# Patient Record
Sex: FEMALE | Race: BLACK OR AFRICAN AMERICAN | NOT HISPANIC OR LATINO
[De-identification: names, ages, dates, MRNs, and addresses within clinical notes are randomized per-mention and may not be internally consistent; named-entity substitution may affect disease eponyms.]

---

## 2022-01-18 ENCOUNTER — APPOINTMENT (OUTPATIENT)
Dept: ANTEPARTUM | Facility: CLINIC | Age: 32
End: 2022-01-18

## 2022-01-18 ENCOUNTER — OUTPATIENT (OUTPATIENT)
Dept: INPATIENT UNIT | Facility: HOSPITAL | Age: 32
LOS: 1 days | Discharge: HOME | End: 2022-01-18
Payer: MEDICAID

## 2022-01-18 ENCOUNTER — ASOB RESULT (OUTPATIENT)
Age: 32
End: 2022-01-18

## 2022-01-18 VITALS
HEART RATE: 69 BPM | DIASTOLIC BLOOD PRESSURE: 60 MMHG | TEMPERATURE: 98 F | RESPIRATION RATE: 15 BRPM | SYSTOLIC BLOOD PRESSURE: 114 MMHG

## 2022-01-18 VITALS — DIASTOLIC BLOOD PRESSURE: 64 MMHG | SYSTOLIC BLOOD PRESSURE: 113 MMHG | HEART RATE: 71 BPM

## 2022-01-18 DIAGNOSIS — Z3A.00 WEEKS OF GESTATION OF PREGNANCY NOT SPECIFIED: ICD-10-CM

## 2022-01-18 DIAGNOSIS — O26.899 OTHER SPECIFIED PREGNANCY RELATED CONDITIONS, UNSPECIFIED TRIMESTER: ICD-10-CM

## 2022-01-18 DIAGNOSIS — Z98.890 OTHER SPECIFIED POSTPROCEDURAL STATES: Chronic | ICD-10-CM

## 2022-01-18 PROBLEM — Z00.00 ENCOUNTER FOR PREVENTIVE HEALTH EXAMINATION: Status: ACTIVE | Noted: 2022-01-18

## 2022-01-18 PROCEDURE — 76818 FETAL BIOPHYS PROFILE W/NST: CPT | Mod: 26

## 2022-01-18 PROCEDURE — 99213 OFFICE O/P EST LOW 20 MIN: CPT

## 2022-01-18 NOTE — OB PROVIDER TRIAGE NOTE - HISTORY OF PRESENT ILLNESS
This is a 31 year old  at 37.0 weeks gestational age presents from [facility] to transfer PN for findings of polyhydramnios. Pt states that she has been followed in [facility] for MELISSA of 30's since decemeber. States repeat glucose and genetics were wnl and polyhydramnios is unexplained. Reports +GFM, denies LOF, VB or contractions.   Reports + Covid infection- unsure when, not covid vaccinated. denies fever, chills, SOB or cough  AP course otherwise uncomplicated    med: asthma, albuterol prn, last took this am  surg: toe surgery  gyn: jose g D&C  top D&C  OB: 2011  7#6  current meds: pnv, albuterol  NKDA   seasonal, shellfish- hives/ anaphalaxysis

## 2022-01-18 NOTE — OB RN TRIAGE NOTE - FALL HARM RISK - UNIVERSAL INTERVENTIONS
Bed in lowest position, wheels locked, appropriate side rails in place/Call bell, personal items and telephone in reach/Instruct patient to call for assistance before getting out of bed or chair/Non-slip footwear when patient is out of bed/[place] to call system/Physically safe environment - no spills, clutter or unnecessary equipment/Purposeful Proactive Rounding/Room/bathroom lighting operational, light cord in reach

## 2022-01-18 NOTE — CHART NOTE - NSCHARTNOTEFT_GEN_A_CORE
R3 Note    Patient scanned at bedside w/ Dr. Rojas.  MELISSA at [place] [address], repeat MELISSA performed by us 32.5.  Will schedule patient for IOL at 38 wks on 1/23.  Process of IOL explained to patient, she is in agreement.  Patient aware to expect call with follow up details on timing of induction.    MGreenman PGY3

## 2022-01-18 NOTE — OB PROVIDER TRIAGE NOTE - NSHPPHYSICALEXAM_GEN_ALL_CORE
Vital Signs Last 24 Hrs  T(C): 36.7 (18 Jan 2022 11:20), Max: 36.7 (18 Jan 2022 11:20)  T(F): 98.1 (18 Jan 2022 11:20), Max: 98.1 (18 Jan 2022 11:20)  HR: 69 (18 Jan 2022 11:20) (69 - 69)  BP: 114/60 (18 Jan 2022 11:20) (114/60 - 114/60)  BP(mean): --  RR: 15 (18 Jan 2022 11:20) (15 - 15)  SpO2: --    A&O x3  CTAB  abdomen: gravid, soft, nontender  NST in progress Vital Signs Last 24 Hrs  T(C): 36.7 (18 Jan 2022 11:20), Max: 36.7 (18 Jan 2022 11:20)  T(F): 98.1 (18 Jan 2022 11:20), Max: 98.1 (18 Jan 2022 11:20)  HR: 69 (18 Jan 2022 11:20) (69 - 69)  BP: 114/60 (18 Jan 2022 11:20) (114/60 - 114/60)  BP(mean): --  RR: 15 (18 Jan 2022 11:20) (15 - 15)  SpO2: --    A&O x3  CTAB  abdomen: gravid, soft, nontender   baseline, moderate variability, positive accels, no decels, no contractions  TAS: by ATU vtx, anterior placenta, bpp 8/8, 3401 g( 7#8), vanessa 29.36

## 2022-01-18 NOTE — OB PROVIDER TRIAGE NOTE - ADDITIONAL INSTRUCTIONS
Dr Rojas, Dr Vega at bedside for evaluation  maternal/ fetal surveillance reassuring  Plan for IOL on 1/23- dr vega will arrange for IOL and Induction department will call patient with details  continue fetal kick counts, rest and activity as tolerated, increase PO fluid  follow up as scheduled  pt verbalized understanding of instructions given  discharged home

## 2022-01-18 NOTE — OB PROVIDER TRIAGE NOTE - NSOBPROVIDERNOTE_OBGYN_ALL_OB_FT
1230  AT sonographer  Alina at bedside for ultrasound 1230  ATU sonographer  Alina at bedside for ultrasound    1330  Dr Rojas, Dr Vega at bedside for evaluation  maternal/ fetal surveillance reassuring  Plan for IOL on 1/23- dr vega will arrange for IOL and Induction department will call patient with details  continue fetal kick counts, rest and activity as tolerated, increase PO fluid  follow up as scheduled  pt verbalized understanding of instructions given  discharged home

## 2022-01-24 ENCOUNTER — INPATIENT (INPATIENT)
Facility: HOSPITAL | Age: 32
LOS: 5 days | Discharge: HOME HEALTH | End: 2022-01-30
Attending: SPECIALIST | Admitting: SPECIALIST
Payer: MEDICAID

## 2022-01-24 VITALS — TEMPERATURE: 99 F

## 2022-01-24 DIAGNOSIS — O26.899 OTHER SPECIFIED PREGNANCY RELATED CONDITIONS, UNSPECIFIED TRIMESTER: ICD-10-CM

## 2022-01-24 DIAGNOSIS — Z3A.00 WEEKS OF GESTATION OF PREGNANCY NOT SPECIFIED: ICD-10-CM

## 2022-01-24 DIAGNOSIS — Z98.890 OTHER SPECIFIED POSTPROCEDURAL STATES: Chronic | ICD-10-CM

## 2022-01-24 PROBLEM — J45.909 UNSPECIFIED ASTHMA, UNCOMPLICATED: Chronic | Status: ACTIVE | Noted: 2022-01-18

## 2022-01-24 LAB
BASOPHILS # BLD AUTO: 0 K/UL — SIGNIFICANT CHANGE UP (ref 0–0.2)
BASOPHILS NFR BLD AUTO: 0 % — SIGNIFICANT CHANGE UP (ref 0–2)
BLD GP AB SCN SERPL QL: NEGATIVE — SIGNIFICANT CHANGE UP
EOSINOPHIL # BLD AUTO: 3 K/UL — HIGH (ref 0–0.5)
EOSINOPHIL NFR BLD AUTO: 22.6 % — HIGH (ref 0–6)
HCT VFR BLD CALC: 33.5 % — LOW (ref 34.5–45)
HGB BLD-MCNC: 11.6 G/DL — SIGNIFICANT CHANGE UP (ref 11.5–15.5)
HIV 1+2 AB+HIV1 P24 AG SERPL QL IA: SIGNIFICANT CHANGE UP
IANC: 6.28 K/UL — SIGNIFICANT CHANGE UP (ref 1.5–8.5)
LYMPHOCYTES # BLD AUTO: 1.73 K/UL — SIGNIFICANT CHANGE UP (ref 1–3.3)
LYMPHOCYTES # BLD AUTO: 13 % — SIGNIFICANT CHANGE UP (ref 13–44)
MCHC RBC-ENTMCNC: 31.6 PG — SIGNIFICANT CHANGE UP (ref 27–34)
MCHC RBC-ENTMCNC: 34.6 GM/DL — SIGNIFICANT CHANGE UP (ref 32–36)
MCV RBC AUTO: 91.3 FL — SIGNIFICANT CHANGE UP (ref 80–100)
MONOCYTES # BLD AUTO: 0.58 K/UL — SIGNIFICANT CHANGE UP (ref 0–0.9)
MONOCYTES NFR BLD AUTO: 4.4 % — SIGNIFICANT CHANGE UP (ref 2–14)
NEUTROPHILS # BLD AUTO: 7.5 K/UL — HIGH (ref 1.8–7.4)
NEUTROPHILS NFR BLD AUTO: 56.5 % — SIGNIFICANT CHANGE UP (ref 43–77)
PLATELET # BLD AUTO: 222 K/UL — SIGNIFICANT CHANGE UP (ref 150–400)
RBC # BLD: 3.67 M/UL — LOW (ref 3.8–5.2)
RBC # FLD: 13.4 % — SIGNIFICANT CHANGE UP (ref 10.3–14.5)
RH IG SCN BLD-IMP: POSITIVE — SIGNIFICANT CHANGE UP
RH IG SCN BLD-IMP: POSITIVE — SIGNIFICANT CHANGE UP
WBC # BLD: 13.27 K/UL — HIGH (ref 3.8–10.5)
WBC # FLD AUTO: 13.27 K/UL — HIGH (ref 3.8–10.5)

## 2022-01-24 RX ORDER — OXYTOCIN 10 UNIT/ML
333.33 VIAL (ML) INJECTION
Qty: 20 | Refills: 0 | Status: DISCONTINUED | OUTPATIENT
Start: 2022-01-24 | End: 2022-01-24

## 2022-01-24 RX ORDER — CITRIC ACID/SODIUM CITRATE 300-500 MG
15 SOLUTION, ORAL ORAL EVERY 6 HOURS
Refills: 0 | Status: DISCONTINUED | OUTPATIENT
Start: 2022-01-24 | End: 2022-01-25

## 2022-01-24 RX ORDER — ALBUTEROL 90 UG/1
2 AEROSOL, METERED ORAL EVERY 6 HOURS
Refills: 0 | Status: DISCONTINUED | OUTPATIENT
Start: 2022-01-24 | End: 2022-01-26

## 2022-01-24 RX ORDER — SODIUM CHLORIDE 9 MG/ML
1000 INJECTION, SOLUTION INTRAVENOUS
Refills: 0 | Status: DISCONTINUED | OUTPATIENT
Start: 2022-01-24 | End: 2022-01-25

## 2022-01-24 RX ORDER — INFLUENZA VIRUS VACCINE 15; 15; 15; 15 UG/.5ML; UG/.5ML; UG/.5ML; UG/.5ML
0.5 SUSPENSION INTRAMUSCULAR ONCE
Refills: 0 | Status: DISCONTINUED | OUTPATIENT
Start: 2022-01-24 | End: 2022-01-30

## 2022-01-24 RX ORDER — AMPICILLIN TRIHYDRATE 250 MG
1 CAPSULE ORAL EVERY 4 HOURS
Refills: 0 | Status: DISCONTINUED | OUTPATIENT
Start: 2022-01-24 | End: 2022-01-25

## 2022-01-24 RX ORDER — AMPICILLIN TRIHYDRATE 250 MG
1 CAPSULE ORAL EVERY 4 HOURS
Refills: 0 | Status: DISCONTINUED | OUTPATIENT
Start: 2022-01-24 | End: 2022-01-24

## 2022-01-24 RX ORDER — OXYTOCIN 10 UNIT/ML
333.33 VIAL (ML) INJECTION
Qty: 20 | Refills: 0 | Status: DISCONTINUED | OUTPATIENT
Start: 2022-01-24 | End: 2022-01-25

## 2022-01-24 RX ORDER — CITRIC ACID/SODIUM CITRATE 300-500 MG
15 SOLUTION, ORAL ORAL EVERY 6 HOURS
Refills: 0 | Status: DISCONTINUED | OUTPATIENT
Start: 2022-01-24 | End: 2022-01-24

## 2022-01-24 RX ORDER — SODIUM CHLORIDE 9 MG/ML
1000 INJECTION, SOLUTION INTRAVENOUS
Refills: 0 | Status: DISCONTINUED | OUTPATIENT
Start: 2022-01-24 | End: 2022-01-24

## 2022-01-24 RX ORDER — AMPICILLIN TRIHYDRATE 250 MG
2 CAPSULE ORAL ONCE
Refills: 0 | Status: DISCONTINUED | OUTPATIENT
Start: 2022-01-24 | End: 2022-01-25

## 2022-01-24 RX ADMIN — SODIUM CHLORIDE 125 MILLILITER(S): 9 INJECTION, SOLUTION INTRAVENOUS at 19:10

## 2022-01-24 NOTE — CHART NOTE - NSCHARTNOTEFT_GEN_A_CORE
OBGYN Service Attending    Called to pt's bedside by Chief Resident Dr. Angel to reassess fetal position.  On bedside sono fetus is transverse back up with head to maternal right.  Pt is a P1 with planned induction for polyhydramnios.  Fetal status at this time is reassuring.  Discussed with pt that delivery with this fetal position would be via .  Pt would like to avoid  if possible.  Discussed possible ECV, will discuss with MFM at Safety Rounds re: possible ECV in AM.    MD Melissa

## 2022-01-24 NOTE — OB PROVIDER H&P - NSHPPHYSICALEXAM_GEN_ALL_CORE
Gen: NAD  Abd: Soft, Nontender   Ext: Nontender, no erythema    SVE: Gen: NAD  Abd: Soft, Nontender   Ext: Nontender, no erythema    SVE: 0.5/50/-3

## 2022-01-24 NOTE — CHART NOTE - NSCHARTNOTEFT_GEN_A_CORE
R4 Chart Note    Patient re-scanned for presentation at bedside w/Dr. White.   Fetus transverse back up, head maternal right.     Patient is a 35 yo  at [id number] admitted for IOL for polyhydramnios wuth MELISSA 37, previous  in 2012 (7#3) and would like to avoid  if possible and desires ECV.   Will discuss on MFM safety rounds and observe on continuos fetal monitoring. Plan for possible ECV in AM and induction of labor vs  for malpresentation.     E Stanley PGY4  Pt scanned and discussed w/Dr. White

## 2022-01-24 NOTE — OB PROVIDER H&P - HISTORY OF PRESENT ILLNESS
32 y/o  at 38w presenting for scheduled induction for polyhydramnios (MELISSA in the 30s) at [facility] where she received PNC and here last week. Denies CTX, LOF, VB. Good FM.     GBS pos   EFW     ObHx:    FT  7#3  SAB x2    GynHx: Denies  PMHx: Asthma   PSHx: denies   Meds: Albuterol,  30 y/o  at 38w presenting for scheduled induction for polyhydramnios (MELISSA in the 30s) at [facility] where she received PNC and here last week. Denies CTX, LOF, VB. Good FM.     GBS pos   EFW 3401    ObHx:    FT  7#3  SAB x2    GynHx: Denies  PMHx: Asthma   PSHx: denies   Meds: Albuterol, PNV  All: seasonal  PsychHx: denies depression/anxiety    Accepts blood

## 2022-01-24 NOTE — OB RN PATIENT PROFILE - FALL HARM RISK - UNIVERSAL INTERVENTIONS
Bed in lowest position, wheels locked, appropriate side rails in place/Call bell, personal items and telephone in reach/Instruct patient to call for assistance before getting out of bed or chair/Non-slip footwear when patient is out of bed/[place] to call system/Physically safe environment - no spills, clutter or unnecessary equipment/Purposeful Proactive Rounding/Room/bathroom lighting operational, light cord in reach

## 2022-01-24 NOTE — OB PROVIDER H&P - ASSESSMENT
32 y/o  at 38w presenting for scheduled induction for polyhydramnios  - Admit to L&D  - EFM & [place]  - IVF & CLD  - PO cytotec for induction   - NO ABHISHEK Nelson, PGY-1  32 y/o  at 38w presenting for scheduled induction for polyhydramnios  - Admit to L&D  - EFM & [place]  - IVF & CLD  - GBS pos; ampicillin  - Needs repeat sono to confirm presentation    BOYD Nelson, PGY-1

## 2022-01-24 NOTE — CHART NOTE - NSCHARTNOTEFT_GEN_A_CORE
MS. Downing is a 33 yo  at [id number] admitted for IOL for polyhydramnios with MELISSA 37, now found to have transverse presentation and expresses desire for . Pt discussed with MFM fellow, Dr. Campo, plan for ECV  AM.    Maricarmen Arroyo MD  OBGYN PGY3

## 2022-01-25 ENCOUNTER — TRANSCRIPTION ENCOUNTER (OUTPATIENT)
Age: 32
End: 2022-01-25

## 2022-01-25 LAB
COVID-19 SPIKE DOMAIN AB INTERP: POSITIVE
COVID-19 SPIKE DOMAIN ANTIBODY RESULT: 5.83 U/ML — HIGH
RUBV IGG SER-ACNC: 2.8 INDEX — SIGNIFICANT CHANGE UP
RUBV IGG SER-IMP: POSITIVE — SIGNIFICANT CHANGE UP
SARS-COV-2 IGG+IGM SERPL QL IA: 5.83 U/ML — HIGH
SARS-COV-2 IGG+IGM SERPL QL IA: POSITIVE
T PALLIDUM AB TITR SER: NEGATIVE — SIGNIFICANT CHANGE UP

## 2022-01-25 PROCEDURE — 59412 ANTEPARTUM MANIPULATION: CPT | Mod: GC

## 2022-01-25 RX ORDER — AMPICILLIN TRIHYDRATE 250 MG
1 CAPSULE ORAL EVERY 4 HOURS
Refills: 0 | Status: DISCONTINUED | OUTPATIENT
Start: 2022-01-25 | End: 2022-01-25

## 2022-01-25 RX ORDER — AMPICILLIN TRIHYDRATE 250 MG
2 CAPSULE ORAL ONCE
Refills: 0 | Status: COMPLETED | OUTPATIENT
Start: 2022-01-25 | End: 2022-01-25

## 2022-01-25 RX ORDER — OXYTOCIN 10 UNIT/ML
2 VIAL (ML) INJECTION
Qty: 30 | Refills: 0 | Status: DISCONTINUED | OUTPATIENT
Start: 2022-01-25 | End: 2022-01-26

## 2022-01-25 RX ADMIN — Medication 216 GRAM(S): at 14:03

## 2022-01-25 RX ADMIN — Medication 2 MILLIUNIT(S)/MIN: at 21:59

## 2022-01-25 RX ADMIN — Medication 108 GRAM(S): at 22:20

## 2022-01-25 RX ADMIN — Medication 108 GRAM(S): at 18:09

## 2022-01-25 NOTE — CHART NOTE - NSCHARTNOTEFT_GEN_A_CORE
PGY1 Labor & Delivery Progress Note     Pt seen & examined at bedside for complaints of painful contractions    SVE: 3/-3  EFM: Cat 1    T(C): 36.9 (22 @ 19:26), Max: 37.2 (22 @ 18:30)  HR: 86 (22 @ 20:55) (69 - 94)  BP: 121/69 (22 @ 20:55) (105/58 - 132/78)  RR: 16 (22 @ 11:00) (16 - 16)  SpO2: 97% (22 @ 20:52) (95% - 99%)    Plan:  30y/o  in stable condition  - s/p ECV and AROM for unstable lie  - vertex confirmed on bedside sono  - s/p PO, for pitocin 2x2  - pt requesting epidural  - Continuous EFM, [facility]  - Con't IVF    D/W attending physician Dr. Kannan Billy, PGY-1

## 2022-01-26 ENCOUNTER — RESULT REVIEW (OUTPATIENT)
Age: 32
End: 2022-01-26

## 2022-01-26 DIAGNOSIS — O40.3XX0 POLYHYDRAMNIOS, THIRD TRIMESTER, NOT APPLICABLE OR UNSPECIFIED: ICD-10-CM

## 2022-01-26 PROCEDURE — 59514 CESAREAN DELIVERY ONLY: CPT | Mod: U9,UB,GC

## 2022-01-26 PROCEDURE — 88307 TISSUE EXAM BY PATHOLOGIST: CPT | Mod: 26

## 2022-01-26 PROCEDURE — 93010 ELECTROCARDIOGRAM REPORT: CPT

## 2022-01-26 DEVICE — ARISTA 3GR: Type: IMPLANTABLE DEVICE | Status: FUNCTIONAL

## 2022-01-26 RX ORDER — MAGNESIUM HYDROXIDE 400 MG/1
30 TABLET, CHEWABLE ORAL
Refills: 0 | Status: DISCONTINUED | OUTPATIENT
Start: 2022-01-26 | End: 2022-01-30

## 2022-01-26 RX ORDER — OXYCODONE HYDROCHLORIDE 5 MG/1
5 TABLET ORAL
Refills: 0 | Status: DISCONTINUED | OUTPATIENT
Start: 2022-01-26 | End: 2022-01-30

## 2022-01-26 RX ORDER — SODIUM CHLORIDE 9 MG/ML
300 INJECTION INTRAMUSCULAR; INTRAVENOUS; SUBCUTANEOUS ONCE
Refills: 0 | Status: COMPLETED | OUTPATIENT
Start: 2022-01-26 | End: 2022-01-26

## 2022-01-26 RX ORDER — NALOXONE HYDROCHLORIDE 4 MG/.1ML
0.1 SPRAY NASAL
Refills: 0 | Status: DISCONTINUED | OUTPATIENT
Start: 2022-01-26 | End: 2022-01-30

## 2022-01-26 RX ORDER — OXYTOCIN 10 UNIT/ML
333.33 VIAL (ML) INJECTION
Qty: 20 | Refills: 0 | Status: DISCONTINUED | OUTPATIENT
Start: 2022-01-26 | End: 2022-01-30

## 2022-01-26 RX ORDER — OXYCODONE HYDROCHLORIDE 5 MG/1
10 TABLET ORAL
Refills: 0 | Status: DISCONTINUED | OUTPATIENT
Start: 2022-01-26 | End: 2022-01-30

## 2022-01-26 RX ORDER — SODIUM CHLORIDE 9 MG/ML
1000 INJECTION INTRAMUSCULAR; INTRAVENOUS; SUBCUTANEOUS
Refills: 0 | Status: DISCONTINUED | OUTPATIENT
Start: 2022-01-26 | End: 2022-01-26

## 2022-01-26 RX ORDER — ACETAMINOPHEN 500 MG
975 TABLET ORAL
Refills: 0 | Status: DISCONTINUED | OUTPATIENT
Start: 2022-01-26 | End: 2022-01-30

## 2022-01-26 RX ORDER — LANOLIN
1 OINTMENT (GRAM) TOPICAL EVERY 6 HOURS
Refills: 0 | Status: DISCONTINUED | OUTPATIENT
Start: 2022-01-26 | End: 2022-01-30

## 2022-01-26 RX ORDER — CITRIC ACID/SODIUM CITRATE 300-500 MG
15 SOLUTION, ORAL ORAL EVERY 6 HOURS
Refills: 0 | Status: DISCONTINUED | OUTPATIENT
Start: 2022-01-26 | End: 2022-01-26

## 2022-01-26 RX ORDER — OXYCODONE HYDROCHLORIDE 5 MG/1
5 TABLET ORAL ONCE
Refills: 0 | Status: DISCONTINUED | OUTPATIENT
Start: 2022-01-26 | End: 2022-01-30

## 2022-01-26 RX ORDER — AMPICILLIN TRIHYDRATE 250 MG
1 CAPSULE ORAL EVERY 4 HOURS
Refills: 0 | Status: DISCONTINUED | OUTPATIENT
Start: 2022-01-26 | End: 2022-01-26

## 2022-01-26 RX ORDER — ONDANSETRON 8 MG/1
4 TABLET, FILM COATED ORAL EVERY 6 HOURS
Refills: 0 | Status: DISCONTINUED | OUTPATIENT
Start: 2022-01-26 | End: 2022-01-30

## 2022-01-26 RX ORDER — DIPHENHYDRAMINE HCL 50 MG
25 CAPSULE ORAL ONCE
Refills: 0 | Status: COMPLETED | OUTPATIENT
Start: 2022-01-26 | End: 2022-01-26

## 2022-01-26 RX ORDER — HEPARIN SODIUM 5000 [USP'U]/ML
10000 INJECTION INTRAVENOUS; SUBCUTANEOUS EVERY 12 HOURS
Refills: 0 | Status: DISCONTINUED | OUTPATIENT
Start: 2022-01-26 | End: 2022-01-30

## 2022-01-26 RX ORDER — SODIUM CHLORIDE 9 MG/ML
1000 INJECTION, SOLUTION INTRAVENOUS
Refills: 0 | Status: DISCONTINUED | OUTPATIENT
Start: 2022-01-26 | End: 2022-01-27

## 2022-01-26 RX ORDER — OXYTOCIN 10 UNIT/ML
333.33 VIAL (ML) INJECTION
Qty: 20 | Refills: 0 | Status: DISCONTINUED | OUTPATIENT
Start: 2022-01-26 | End: 2022-01-27

## 2022-01-26 RX ORDER — SIMETHICONE 80 MG/1
80 TABLET, CHEWABLE ORAL EVERY 4 HOURS
Refills: 0 | Status: DISCONTINUED | OUTPATIENT
Start: 2022-01-26 | End: 2022-01-30

## 2022-01-26 RX ORDER — KETOROLAC TROMETHAMINE 30 MG/ML
30 SYRINGE (ML) INJECTION EVERY 6 HOURS
Refills: 0 | Status: COMPLETED | OUTPATIENT
Start: 2022-01-26 | End: 2022-01-27

## 2022-01-26 RX ORDER — AMPICILLIN TRIHYDRATE 250 MG
2 CAPSULE ORAL EVERY 6 HOURS
Refills: 0 | Status: CANCELLED | OUTPATIENT
Start: 2022-01-27 | End: 2022-01-26

## 2022-01-26 RX ORDER — SODIUM CHLORIDE 9 MG/ML
1000 INJECTION, SOLUTION INTRAVENOUS
Refills: 0 | Status: DISCONTINUED | OUTPATIENT
Start: 2022-01-26 | End: 2022-01-26

## 2022-01-26 RX ORDER — GENTAMICIN SULFATE 40 MG/ML
400 VIAL (ML) INJECTION ONCE
Refills: 0 | Status: DISCONTINUED | OUTPATIENT
Start: 2022-01-26 | End: 2022-01-26

## 2022-01-26 RX ORDER — AMPICILLIN TRIHYDRATE 250 MG
CAPSULE ORAL
Refills: 0 | Status: DISCONTINUED | OUTPATIENT
Start: 2022-01-26 | End: 2022-01-26

## 2022-01-26 RX ORDER — TETANUS TOXOID, REDUCED DIPHTHERIA TOXOID AND ACELLULAR PERTUSSIS VACCINE, ADSORBED 5; 2.5; 8; 8; 2.5 [IU]/.5ML; [IU]/.5ML; UG/.5ML; UG/.5ML; UG/.5ML
0.5 SUSPENSION INTRAMUSCULAR ONCE
Refills: 0 | Status: DISCONTINUED | OUTPATIENT
Start: 2022-01-26 | End: 2022-01-30

## 2022-01-26 RX ORDER — MORPHINE SULFATE 50 MG/1
2 CAPSULE, EXTENDED RELEASE ORAL ONCE
Refills: 0 | Status: DISCONTINUED | OUTPATIENT
Start: 2022-01-26 | End: 2022-01-27

## 2022-01-26 RX ORDER — ERTAPENEM SODIUM 1 G/1
1000 INJECTION, POWDER, LYOPHILIZED, FOR SOLUTION INTRAMUSCULAR; INTRAVENOUS ONCE
Refills: 0 | Status: COMPLETED | OUTPATIENT
Start: 2022-01-26 | End: 2022-01-26

## 2022-01-26 RX ORDER — DIPHENHYDRAMINE HCL 50 MG
25 CAPSULE ORAL EVERY 6 HOURS
Refills: 0 | Status: DISCONTINUED | OUTPATIENT
Start: 2022-01-26 | End: 2022-01-30

## 2022-01-26 RX ORDER — IBUPROFEN 200 MG
600 TABLET ORAL EVERY 6 HOURS
Refills: 0 | Status: COMPLETED | OUTPATIENT
Start: 2022-01-26 | End: 2022-12-25

## 2022-01-26 RX ORDER — AMPICILLIN TRIHYDRATE 250 MG
2 CAPSULE ORAL ONCE
Refills: 0 | Status: DISCONTINUED | OUTPATIENT
Start: 2022-01-26 | End: 2022-01-26

## 2022-01-26 RX ORDER — ACETAMINOPHEN 500 MG
975 TABLET ORAL ONCE
Refills: 0 | Status: COMPLETED | OUTPATIENT
Start: 2022-01-26 | End: 2022-01-26

## 2022-01-26 RX ORDER — HYDROMORPHONE HYDROCHLORIDE 2 MG/ML
1 INJECTION INTRAMUSCULAR; INTRAVENOUS; SUBCUTANEOUS
Refills: 0 | Status: DISCONTINUED | OUTPATIENT
Start: 2022-01-26 | End: 2022-01-30

## 2022-01-26 RX ADMIN — SODIUM CHLORIDE 600 MILLILITER(S): 9 INJECTION INTRAMUSCULAR; INTRAVENOUS; SUBCUTANEOUS at 13:32

## 2022-01-26 RX ADMIN — Medication 108 GRAM(S): at 14:32

## 2022-01-26 RX ADMIN — Medication 25 MILLIGRAM(S): at 17:45

## 2022-01-26 RX ADMIN — ERTAPENEM SODIUM 120 MILLIGRAM(S): 1 INJECTION, POWDER, LYOPHILIZED, FOR SOLUTION INTRAMUSCULAR; INTRAVENOUS at 23:20

## 2022-01-26 RX ADMIN — Medication 975 MILLIGRAM(S): at 20:21

## 2022-01-26 RX ADMIN — Medication 108 GRAM(S): at 02:20

## 2022-01-26 RX ADMIN — SODIUM CHLORIDE 125 MILLILITER(S): 9 INJECTION INTRAMUSCULAR; INTRAVENOUS; SUBCUTANEOUS at 13:52

## 2022-01-26 RX ADMIN — Medication 108 GRAM(S): at 18:23

## 2022-01-26 RX ADMIN — SODIUM CHLORIDE 125 MILLILITER(S): 9 INJECTION, SOLUTION INTRAVENOUS at 06:56

## 2022-01-26 RX ADMIN — Medication 108 GRAM(S): at 10:35

## 2022-01-26 NOTE — OB PROVIDER LABOR PROGRESS NOTE - ASSESSMENT
A/P: 31y P1 admitted for IOL for polyhramnios, s/p successful EVC  - Labor: for PO cytotec, AROM when able  - Fetus: reassuring  - GBS: positive, on amp  - Analgesia: MAYRAN    ELIO David PGY4  w/ Dr. Rojas
IUPC replaced.  Edematous cervix, IV benadryl to be given.   Peanut ball placed.  Reassuring FHT at this time. Will reassess in 2 hrs and decide MOD at that time.    Emory Deras,PGY2  D/w Dr. Fabian & Dr. Samaniego
IUPC in place, CTX are adequate with pitocin. No cervical change. Will start amnioinfusion.     D/w ELIO David, PGY-4  BOYD Nelson, PGY-1

## 2022-01-26 NOTE — OB PROVIDER DELIVERY SUMMARY - NSSELHIDDEN_OBGYN_ALL_OB_FT
[NS_DeliveryAttending1_OBGYN_ALL_OB_FT:[id number]],[NS_DeliveryAssist1_OBGYN_ALL_OB_FT:[id number]],[NS_DeliveryRN_OBGYN_ALL_OB_FT:[id number]=]

## 2022-01-26 NOTE — OB PROVIDER DELIVERY SUMMARY - NSPROVIDERDELIVERYNOTE_OBGYN_ALL_OB_FT
pLTCS performed due to arrest of dilation at 5cm, chorio, cat II tracing remote from delivery. Grossly normal uterus, tubes and ovaries. Viable female infant delivered from vertex presentation. APGARS 3,8. Weight 3355g. Hysterotomy reapproximated in running locked fashion with a second imbricating layer. Niels applied over hysterotomy repair and bladder flap to ensure further hemostasis. Muscles reapproximated in mattress stitches.       IVF 1700   UOP 50

## 2022-01-26 NOTE — CHART NOTE - NSCHARTNOTEFT_GEN_A_CORE
PA Note (delayed due to clinical duties)    patient seen & examined for cont of management  comfortable with epidural    VS  T(C): 37.0 (01-26-22 @ 09:05)  HR: 83 (01-26-22 @ 09:48)  BP: 128/71 (01-26-22 @ 09:32)  RR: 16 (01-26-22 @ 04:43)  SpO2: 97% (01-26-22 @ 09:40)    /min-mod david/+accels/no decels  [place] q 2-5min  4/70/-3 IUPC inserted as per Dr Rojas    cont efm/toco  cont pitocin  judith saul

## 2022-01-26 NOTE — OB PROVIDER LABOR PROGRESS NOTE - NS_SUBJECTIVE/OBJECTIVE_OBGYN_ALL_OB_FT
PGY4 Labor Note    Patient seen and evaluated at bedside.     External cephalic version performed with Dr. Rojas under ultrasound guidance. Vertex presentation confirmed multiple times. Fetal heart rate reassuring.     T(C): 37.1 (01-25-22 @ 11:00), Max: 37.2 (01-24-22 @ 18:24)  HR: 76 (01-25-22 @ 11:00) (69 - 78)  BP: 109/62 (01-25-22 @ 11:00) (108/62 - 121/62)  RR: 16 (01-25-22 @ 11:00) (16 - 17)  SpO2: 100% (01-24-22 @ 18:43) (100% - 100%)
IUPC displaced upon pt movement. Replaced IUPC.
Pt seen and examined for repetitive late decelerations

## 2022-01-26 NOTE — OB NEONATOLOGY/PEDIATRICIAN DELIVERY SUMMARY - NSPEDSNEONOTESA_OBGYN_ALL_OB_FT
Called to delivery of a 38.1 wk infant to a 31 y.o. , O+/GBS + (tx with amp x8), all other PNL unremarkable, COVID + from .  Mother was a transfer from [facility], successful version, IOL for polyhydramnios. OBHX:  (), SAB x1, TOPx1. Prolonged ROM from  at 1313 with clear fluid. Peds called to C/S due to category 2 tracing and maternal fever of 38.3 degrees. Delivered via unscheduled primary c/s, Infant noted to have poor tone and respiratory effort. W/D/S/S, CPAP started and pulse ox placed, sat noted to be 60, PPV started and at 20/5 fiO2 30%, increased to 22/5 fiO2 max of 60%, with good chest rise noted and improvement in satuations to the 90's.  PPV stopped and CPAP resumed ~4 MOL, fiO2 weaned to room air and cpap removed at ~9 MOL.  Infant sent to NICU for rule out sepsis due to EOS of 1.23.    Mother wants hep B and to breast feed. Called to delivery of a 38.1 wk infant to a 31 y.o. , O+/GBS + (tx with amp x8), all other PNL unremarkable, COVID + from .  Mother was a transfer from [facility], successful version, IOL for polyhydramnios. OBHX:  (), SAB x1, TOPx1. Prolonged ROM from  at 1313 with clear fluid. Peds called to C/S due to category 2 tracing and maternal fever of 38.3 degrees and chorio. Delivered via unscheduled primary c/s, Infant noted to have poor tone and respiratory effort. W/D/S/S, CPAP started and pulse ox placed, sat noted to be 60, PPV started and at 20/5 fiO2 30%, increased to 22/5 fiO2 max of 60%, with good chest rise noted and improvement in satuations to the 90's.  PPV stopped and CPAP resumed ~4 MOL, fiO2 weaned to room air and cpap removed at ~9 MOL.  Infant sent to NICU for rule out sepsis due to EOS of 1.23.    Mother wants hep B and to breast feed.

## 2022-01-26 NOTE — CHART NOTE - NSCHARTNOTEFT_GEN_A_CORE
R2 Labor Progress Note    Pt noted to have category 2 tracing (minimal variability, few accels). Pt has been on pitocin for IOL for 14 hours without significant cervical change. Discussed the increased risk of postpartum hemorrhage after prolonged induction of labor with pitocin with the patient. Discussed the  recommendation for pLTCS for NRFHT w/ pt given current circumstances. Pt states she would like to think about it and discuss with her partner.    Will speak with pt again regarding plan once she speaks with her partner.  All questions answered.    Emory Deras, PGY2  D/w Dr. David & Dr. Edwards R2 Labor Progress Note    Pt noted to have intermittent category 2 tracing (minimal variability, few accels). Pt has been on pitocin for IOL for 17 hours without significant cervical change. Discussed the increased risk of postpartum hemorrhage after prolonged induction of labor with pitocin with the patient. Discussed the  recommendation for pLTCS for failure to dilate & NRFHT w/ pt given current circumstances. Pt states she would like to think about it and discuss with her partner.    Will speak with pt again regarding plan once she speaks with her partner.  All questions answered.    Emory Deras, PGY2  D/w Dr. David & Dr. Edwards

## 2022-01-26 NOTE — OB RN INTRAOPERATIVE NOTE - NSSELHIDDEN_OBGYN_ALL_OB_FT
[NS_DeliveryAttending1_OBGYN_ALL_OB_FT:[id number]],[NS_DeliveryAssist1_OBGYN_ALL_OB_FT:[email or web address]=],[NS_DeliveryRN_OBGYN_ALL_OB_FT:[id number]=]

## 2022-01-26 NOTE — OB PROVIDER DELIVERY SUMMARY - NSPPHRISKEVAL_OBGYN_ALL_OB
BMI >40/Over distended uterus (polyhydramnios, macrosomia, multiple gestation)/Atonic uterus/Chorioamnionitis

## 2022-01-26 NOTE — OB PROVIDER LABOR PROGRESS NOTE - NS_OBIHIFHRDETAILS_OBGYN_ALL_OB_FT
baseline 150s, moderate variability, +accels, -decels
150, mod david, + accels, no decels
150/Mod  - accels  + decels

## 2022-01-26 NOTE — OB RN INTRAOPERATIVE NOTE - NS_ELECTROPADLOC_OBGYN_ALL_OB
Patient saw  on wednesday and was told to call if she needed this refilled    Medication: medrol dosepak  Sig: follow package directions  Qty: 21  Dosage: 4  Last Refill: 07-  Pharmacy: [facility] next door  Patient last seen:  Next appointment to be seen:     Left thigh

## 2022-01-26 NOTE — OB RN DELIVERY SUMMARY - NS_LABORCHARACTER_OBGYN_ALL_OB
Induction of labor-AROM/Induction of labor-Medicinal/Febrile (>38C)/External electronic FM/Antibiotics in labor/Chorioamnionitis

## 2022-01-26 NOTE — OB RN DELIVERY SUMMARY - NSSELHIDDEN_OBGYN_ALL_OB_FT
[NS_DeliveryAttending1_OBGYN_ALL_OB_FT:[id number]],[NS_DeliveryAssist1_OBGYN_ALL_OB_FT:[email or web address]=],[NS_DeliveryRN_OBGYN_ALL_OB_FT:[id number]=]

## 2022-01-27 LAB
ALBUMIN SERPL ELPH-MCNC: 2.7 G/DL — LOW (ref 3.3–5)
ALP SERPL-CCNC: 137 U/L — HIGH (ref 40–120)
ALT FLD-CCNC: 14 U/L — SIGNIFICANT CHANGE UP (ref 4–33)
ANION GAP SERPL CALC-SCNC: 14 MMOL/L — SIGNIFICANT CHANGE UP (ref 7–14)
APTT BLD: 30.1 SEC — SIGNIFICANT CHANGE UP (ref 27–36.3)
AST SERPL-CCNC: 18 U/L — SIGNIFICANT CHANGE UP (ref 4–32)
BASOPHILS # BLD AUTO: 0.03 K/UL — SIGNIFICANT CHANGE UP (ref 0–0.2)
BASOPHILS NFR BLD AUTO: 0.1 % — SIGNIFICANT CHANGE UP (ref 0–2)
BILIRUB SERPL-MCNC: 0.5 MG/DL — SIGNIFICANT CHANGE UP (ref 0.2–1.2)
BUN SERPL-MCNC: 10 MG/DL — SIGNIFICANT CHANGE UP (ref 7–23)
CALCIUM SERPL-MCNC: 8.2 MG/DL — LOW (ref 8.4–10.5)
CHLORIDE SERPL-SCNC: 104 MMOL/L — SIGNIFICANT CHANGE UP (ref 98–107)
CO2 SERPL-SCNC: 17 MMOL/L — LOW (ref 22–31)
CREAT SERPL-MCNC: 0.82 MG/DL — SIGNIFICANT CHANGE UP (ref 0.5–1.3)
EOSINOPHIL # BLD AUTO: 0.27 K/UL — SIGNIFICANT CHANGE UP (ref 0–0.5)
EOSINOPHIL NFR BLD AUTO: 1.1 % — SIGNIFICANT CHANGE UP (ref 0–6)
FIBRINOGEN PPP-MCNC: 719 MG/DL — HIGH (ref 290–520)
GLUCOSE SERPL-MCNC: 84 MG/DL — SIGNIFICANT CHANGE UP (ref 70–99)
HCT VFR BLD CALC: 35.1 % — SIGNIFICANT CHANGE UP (ref 34.5–45)
HCT VFR BLD CALC: 38 % — SIGNIFICANT CHANGE UP (ref 34.5–45)
HGB BLD-MCNC: 12.3 G/DL — SIGNIFICANT CHANGE UP (ref 11.5–15.5)
HGB BLD-MCNC: 13.2 G/DL — SIGNIFICANT CHANGE UP (ref 11.5–15.5)
IANC: 20.44 K/UL — HIGH (ref 1.5–8.5)
IMM GRANULOCYTES NFR BLD AUTO: 1.9 % — HIGH (ref 0–1.5)
INR BLD: 1.19 RATIO — HIGH (ref 0.88–1.16)
LDH SERPL L TO P-CCNC: 269 U/L — HIGH (ref 135–225)
LYMPHOCYTES # BLD AUTO: 1.83 K/UL — SIGNIFICANT CHANGE UP (ref 1–3.3)
LYMPHOCYTES # BLD AUTO: 7.5 % — LOW (ref 13–44)
MCHC RBC-ENTMCNC: 31.8 PG — SIGNIFICANT CHANGE UP (ref 27–34)
MCHC RBC-ENTMCNC: 31.9 PG — SIGNIFICANT CHANGE UP (ref 27–34)
MCHC RBC-ENTMCNC: 34.7 GM/DL — SIGNIFICANT CHANGE UP (ref 32–36)
MCHC RBC-ENTMCNC: 35 GM/DL — SIGNIFICANT CHANGE UP (ref 32–36)
MCV RBC AUTO: 91.2 FL — SIGNIFICANT CHANGE UP (ref 80–100)
MCV RBC AUTO: 91.6 FL — SIGNIFICANT CHANGE UP (ref 80–100)
MONOCYTES # BLD AUTO: 1.22 K/UL — HIGH (ref 0–0.9)
MONOCYTES NFR BLD AUTO: 5 % — SIGNIFICANT CHANGE UP (ref 2–14)
NEUTROPHILS # BLD AUTO: 20.44 K/UL — HIGH (ref 1.8–7.4)
NEUTROPHILS NFR BLD AUTO: 84.4 % — HIGH (ref 43–77)
NRBC # BLD: 0 /100 WBCS — SIGNIFICANT CHANGE UP
NRBC # BLD: 0 /100 WBCS — SIGNIFICANT CHANGE UP
NRBC # FLD: 0 K/UL — SIGNIFICANT CHANGE UP
NRBC # FLD: 0 K/UL — SIGNIFICANT CHANGE UP
PLATELET # BLD AUTO: 210 K/UL — SIGNIFICANT CHANGE UP (ref 150–400)
PLATELET # BLD AUTO: 213 K/UL — SIGNIFICANT CHANGE UP (ref 150–400)
POTASSIUM SERPL-MCNC: 3.7 MMOL/L — SIGNIFICANT CHANGE UP (ref 3.5–5.3)
POTASSIUM SERPL-SCNC: 3.7 MMOL/L — SIGNIFICANT CHANGE UP (ref 3.5–5.3)
PROT SERPL-MCNC: 6.3 G/DL — SIGNIFICANT CHANGE UP (ref 6–8.3)
PROTHROM AB SERPL-ACNC: 13.6 SEC — SIGNIFICANT CHANGE UP (ref 10.6–13.6)
RBC # BLD: 3.85 M/UL — SIGNIFICANT CHANGE UP (ref 3.8–5.2)
RBC # BLD: 4.15 M/UL — SIGNIFICANT CHANGE UP (ref 3.8–5.2)
RBC # FLD: 13.3 % — SIGNIFICANT CHANGE UP (ref 10.3–14.5)
RBC # FLD: 13.4 % — SIGNIFICANT CHANGE UP (ref 10.3–14.5)
SODIUM SERPL-SCNC: 135 MMOL/L — SIGNIFICANT CHANGE UP (ref 135–145)
URATE SERPL-MCNC: 5.9 MG/DL — SIGNIFICANT CHANGE UP (ref 2.5–7)
WBC # BLD: 24.26 K/UL — HIGH (ref 3.8–10.5)
WBC # BLD: 24.52 K/UL — HIGH (ref 3.8–10.5)
WBC # FLD AUTO: 24.26 K/UL — HIGH (ref 3.8–10.5)
WBC # FLD AUTO: 24.52 K/UL — HIGH (ref 3.8–10.5)

## 2022-01-27 RX ORDER — IBUPROFEN 200 MG
600 TABLET ORAL EVERY 6 HOURS
Refills: 0 | Status: DISCONTINUED | OUTPATIENT
Start: 2022-01-27 | End: 2022-01-30

## 2022-01-27 RX ADMIN — Medication 975 MILLIGRAM(S): at 09:01

## 2022-01-27 RX ADMIN — Medication 975 MILLIGRAM(S): at 15:00

## 2022-01-27 RX ADMIN — Medication 30 MILLIGRAM(S): at 12:06

## 2022-01-27 RX ADMIN — HEPARIN SODIUM 10000 UNIT(S): 5000 INJECTION INTRAVENOUS; SUBCUTANEOUS at 14:20

## 2022-01-27 RX ADMIN — Medication 975 MILLIGRAM(S): at 21:20

## 2022-01-27 RX ADMIN — Medication 30 MILLIGRAM(S): at 12:25

## 2022-01-27 RX ADMIN — Medication 975 MILLIGRAM(S): at 10:00

## 2022-01-27 RX ADMIN — Medication 30 MILLIGRAM(S): at 17:33

## 2022-01-27 RX ADMIN — Medication 30 MILLIGRAM(S): at 18:15

## 2022-01-27 RX ADMIN — Medication 975 MILLIGRAM(S): at 04:21

## 2022-01-27 RX ADMIN — Medication 975 MILLIGRAM(S): at 04:06

## 2022-01-27 RX ADMIN — Medication 975 MILLIGRAM(S): at 14:14

## 2022-01-27 RX ADMIN — HEPARIN SODIUM 10000 UNIT(S): 5000 INJECTION INTRAVENOUS; SUBCUTANEOUS at 04:06

## 2022-01-27 RX ADMIN — Medication 975 MILLIGRAM(S): at 20:50

## 2022-01-27 NOTE — PROVIDER CONTACT NOTE (OTHER) - ASSESSMENT
Pt AOx4, /97 HR 53. Spo2 99, RR 17. Fundus firm, bleeding light. Pt denies dizziness, sob, chest pain.

## 2022-01-27 NOTE — PROGRESS NOTE ADULT - ASSESSMENT
A/P: 30yo POD#1 s/p pLTCS for arrest, c/b gHTN and chorioamnionitis.  Patient is stable and doing well post-operatively.      #gHTN  - BPs wnl  - Continue to monitor closely    #Chorioamnionitis  - s/p A/G/T and Invanz  - Afebrile since delivery  - Asymptomatic    #Postpartum state  - F/u AM CBC  - Continue regular diet.  - Remove Fonseca  - Increase ambulation.  - Continue motrin, tylenol, oxycodone PRN for pain control    BOYD Nelson  PGY-1 A/P: 30yo POD#1 s/p pLTCS for arrest, c/b gHTN and chorioamnionitis.  Patient is stable and doing well post-operatively.      #gHTN  - BPs wnl  - Continue to monitor closely    #Chorioamnionitis  - s/p A/G/T and Invanz  - Afebrile since delivery  - Asymptomatic    #Postpartum state  - F/u AM CBC  - Remove Fonseca  - Continue regular diet.  - Increase ambulation.  - Continue motrin, tylenol, oxycodone PRN for pain control    BOYD Nelson  PGY-1

## 2022-01-27 NOTE — PROGRESS NOTE ADULT - ATTENDING COMMENTS
Associate Chief of L & D ( late entry)    OB Progress Note:  Delivery, POD#1    S: 30yo POD#1 s/p LTCS .   Patient denies any complaints at this time    O:   Vital Signs Last 24 Hrs  T(C): 36.4 (2022 14:25), Max: 38.3 (2022 20:14)  T(F): 97.5 (2022 14:25), Max: 100.94 (2022 20:14)  HR: 72 (2022 14:25) (48 - 108)  BP: 99/60 (2022 14:25) (99/60 - 158/92)  BP(mean): 98 (2022 02:00) (85 - 106)  RR: 18 (2022 14:25) (14 - 24)  SpO2: 96% (2022 14:25) (81% - 100%)    Labs:  Blood type: O Positive  Rubella IgG: RPR: Negative                          12.3   24.52<H> >-----------< 210    (  @ 07:45 )             35.1                        13.2   24.26<H> >-----------< 213    (  @ 01:04 )             38.0                        11.6   13.27<H> >-----------< 222    (  @ 19:33 )             33.5<L>    22 @ 01:04      135  |  104  |  10  ----------------------------<  84  3.7   |  17<L>  |  0.82        Ca    8.2<L>      2022 01:04    TPro  6.3  /  Alb  2.7<L>  /  TBili  0.5  /  DBili  x   /  AST  18  /  ALT  14  /  AlkPhos  137<H>  22 @ 01:04          PE:    Abdomen: soft, fundus firm, Mildly distended, no tenderness  incision c/d/i.  Lochia; scant  Extremities: No erythema, trace edema    A/P: 30yo POD#1 s/p LTCS due to CAT II complicated by chorioamnionitis     - Continue regular diet.  - Increase ambulation.  - Continue Motrin, Tylenol, oxycodone PRN for pain control.  vs. continue PCEA for pain.  - F/u AM ILA Barajas M.D., M.B.A., M.S.

## 2022-01-28 ENCOUNTER — TRANSCRIPTION ENCOUNTER (OUTPATIENT)
Age: 32
End: 2022-01-28

## 2022-01-28 LAB
BASOPHILS # BLD AUTO: 0.04 K/UL — SIGNIFICANT CHANGE UP (ref 0–0.2)
BASOPHILS NFR BLD AUTO: 0.2 % — SIGNIFICANT CHANGE UP (ref 0–2)
EOSINOPHIL # BLD AUTO: 1.91 K/UL — HIGH (ref 0–0.5)
EOSINOPHIL NFR BLD AUTO: 10.6 % — HIGH (ref 0–6)
HCT VFR BLD CALC: 28.4 % — LOW (ref 34.5–45)
HGB BLD-MCNC: 9.7 G/DL — LOW (ref 11.5–15.5)
IANC: 11.57 K/UL — HIGH (ref 1.5–8.5)
IMM GRANULOCYTES NFR BLD AUTO: 0.8 % — SIGNIFICANT CHANGE UP (ref 0–1.5)
LYMPHOCYTES # BLD AUTO: 17.2 % — SIGNIFICANT CHANGE UP (ref 13–44)
LYMPHOCYTES # BLD AUTO: 3.11 K/UL — SIGNIFICANT CHANGE UP (ref 1–3.3)
MCHC RBC-ENTMCNC: 31.5 PG — SIGNIFICANT CHANGE UP (ref 27–34)
MCHC RBC-ENTMCNC: 34.2 GM/DL — SIGNIFICANT CHANGE UP (ref 32–36)
MCV RBC AUTO: 92.2 FL — SIGNIFICANT CHANGE UP (ref 80–100)
MONOCYTES # BLD AUTO: 1.27 K/UL — HIGH (ref 0–0.9)
MONOCYTES NFR BLD AUTO: 7 % — SIGNIFICANT CHANGE UP (ref 2–14)
NEUTROPHILS # BLD AUTO: 11.57 K/UL — HIGH (ref 1.8–7.4)
NEUTROPHILS NFR BLD AUTO: 64.2 % — SIGNIFICANT CHANGE UP (ref 43–77)
NRBC # BLD: 0 /100 WBCS — SIGNIFICANT CHANGE UP
NRBC # FLD: 0 K/UL — SIGNIFICANT CHANGE UP
PLATELET # BLD AUTO: 208 K/UL — SIGNIFICANT CHANGE UP (ref 150–400)
RBC # BLD: 3.08 M/UL — LOW (ref 3.8–5.2)
RBC # FLD: 13.5 % — SIGNIFICANT CHANGE UP (ref 10.3–14.5)
WBC # BLD: 18.04 K/UL — HIGH (ref 3.8–10.5)
WBC # FLD AUTO: 18.04 K/UL — HIGH (ref 3.8–10.5)

## 2022-01-28 RX ORDER — ACETAMINOPHEN 500 MG
3 TABLET ORAL
Qty: 0 | Refills: 0 | DISCHARGE
Start: 2022-01-28

## 2022-01-28 RX ORDER — OXYCODONE HYDROCHLORIDE 5 MG/1
1 TABLET ORAL
Qty: 6 | Refills: 0
Start: 2022-01-28 | End: 2022-01-29

## 2022-01-28 RX ORDER — IBUPROFEN 200 MG
1 TABLET ORAL
Qty: 0 | Refills: 0 | DISCHARGE
Start: 2022-01-28

## 2022-01-28 RX ORDER — ERTAPENEM SODIUM 1 G/1
1000 INJECTION, POWDER, LYOPHILIZED, FOR SOLUTION INTRAMUSCULAR; INTRAVENOUS EVERY 24 HOURS
Refills: 0 | Status: COMPLETED | OUTPATIENT
Start: 2022-01-28 | End: 2022-01-30

## 2022-01-28 RX ADMIN — ERTAPENEM SODIUM 120 MILLIGRAM(S): 1 INJECTION, POWDER, LYOPHILIZED, FOR SOLUTION INTRAMUSCULAR; INTRAVENOUS at 12:24

## 2022-01-28 RX ADMIN — Medication 600 MILLIGRAM(S): at 13:11

## 2022-01-28 RX ADMIN — Medication 975 MILLIGRAM(S): at 21:01

## 2022-01-28 RX ADMIN — HEPARIN SODIUM 10000 UNIT(S): 5000 INJECTION INTRAVENOUS; SUBCUTANEOUS at 16:07

## 2022-01-28 RX ADMIN — Medication 600 MILLIGRAM(S): at 01:33

## 2022-01-28 RX ADMIN — Medication 975 MILLIGRAM(S): at 03:52

## 2022-01-28 RX ADMIN — Medication 975 MILLIGRAM(S): at 22:00

## 2022-01-28 RX ADMIN — HEPARIN SODIUM 10000 UNIT(S): 5000 INJECTION INTRAVENOUS; SUBCUTANEOUS at 01:33

## 2022-01-28 RX ADMIN — Medication 600 MILLIGRAM(S): at 02:30

## 2022-01-28 RX ADMIN — Medication 975 MILLIGRAM(S): at 04:50

## 2022-01-28 RX ADMIN — Medication 600 MILLIGRAM(S): at 18:39

## 2022-01-28 RX ADMIN — Medication 600 MILLIGRAM(S): at 12:41

## 2022-01-28 NOTE — DISCHARGE NOTE OB - COMMUNITY RESOURCE NAME:
Patient to call and schedule an appointment for   Incision check for 2 weeks after delivery date at [facility]  [phone].

## 2022-01-28 NOTE — DISCHARGE NOTE OB - CARE PROVIDER_API CALL
[facility],   Oncology [facility]  [address] [place] [address]  Phone: [phone]  Fax: (   )    -  Follow Up Time:

## 2022-01-28 NOTE — DISCHARGE NOTE OB - NS MD DC FALL RISK RISK
For information on Fall & Injury Prevention, visit: [email or web address] OR  [email or web address] OR  https://www.cdc.gov/steadi/patient.html

## 2022-01-28 NOTE — DISCHARGE NOTE OB - HOSPITAL COURSE
Patient underwent an uncomplicated primary LTCS for arrest of dilation. , H/H appropriate for EBL post-op. Patient’s postoperative course complicated by gHTN and chorioamnionitis, for which she was treated with antibiotics. She remained hemodynamically stable and afebrile throughout. On day of discharge, the patient was ambulating and voiding spontaneously, tolerating oral intake, pain was well controlled with oral medication, and vital signs were stable. Declines postpartum birth control at this time.  Patient underwent an uncomplicated primary LTCS for arrest of dilation. , H/H 33.5->>30.6 appropriate for EBL. Patient’s hospital course complicated by gHTN and chorioamnionitis (Tmax 38.3 with leukocytosis), for which she was treated with antibiotics. She remained hemodynamically stable throughout this time. WBC downtrended from 24->>13. On day of discharge, patient ambulating and voiding spontaneously, tolerating oral intake, pain was well controlled with oral medication, and vital signs were stable and afebrile for >48 hours. Declines postpartum birth control at this time. Patient was given a blood pressure cuff prescription and instructed to take BP 3x a day and told to present to ED if /110 or greater or if she had symptoms of PEC such as headache, change in vision, RUQ pain, CP or SOB. Patient will follow up in clinic in 2-3 days for BP check.

## 2022-01-28 NOTE — DISCHARGE NOTE OB - CARE PLAN
Principal Discharge DX:	 delivery delivered  Assessment and plan of treatment:	Routine recovery   1 Principal Discharge DX:	 delivery delivered  Assessment and plan of treatment:	Routine recovery  Secondary Diagnosis:	Gestational hypertension  Assessment and plan of treatment:	Follow up in clinic within 2-3 days for a blood pressure check. If you have elevated blood pressures >160/110 or severe headache, blurry vision, increased swelling, or pain in your upper belly on the right side please come to Emergency Department.

## 2022-01-28 NOTE — DISCHARGE NOTE OB - PATIENT PORTAL LINK FT
You can access the FollowMyHealth Patient Portal offered by [facility] by registering at the following website: [email or web address]. By joining [employer]’s FollowMyHealth portal, you will also be able to view your health information using other applications (apps) compatible with our system.

## 2022-01-28 NOTE — DISCHARGE NOTE OB - PLAN OF CARE
Routine recovery Follow up in clinic within 2-3 days for a blood pressure check. If you have elevated blood pressures >160/110 or severe headache, blurry vision, increased swelling, or pain in your upper belly on the right side please come to Emergency Department.

## 2022-01-28 NOTE — PROGRESS NOTE ADULT - ASSESSMENT
A/P: 32yo POD#2 s/p pLTCS for arrest c/b gHTN and chorioamnionitis.  Patient is stable and doing well post-operatively.      #gHTN  - BPs well controlled, asymptomatic    #Chorioamnionitis  - s/p A/G/T and Invanz  - Afebrile, asymptomatic  - F/u AM CBC w/ Diff to trend WBC    #Postpartum State  - Continue regular diet.  - Increase ambulation.  - Continue motrin, tylenol, oxycodone PRN for pain control    BOYD Nelson PGY-1

## 2022-01-28 NOTE — PROGRESS NOTE ADULT - ATTENDING COMMENTS
Associate Chief of L & D ( late entry)    OB Progress Note:  Delivery, POD#2    S: 30yo POD#2 s/p LTCS .   Patient  reports that she has pain.      O:   Vital Signs Last 24 Hrs  T(C): 36.4 (2022 05:11), Max: 36.4 (2022 14:25)  T(F): 97.6 (2022 05:11), Max: 97.6 (2022 05:11)  HR: 63 (2022 05:11) (63 - 73)  BP: 101/54 (2022 05:11) (92/50 - 102/62)  BP(mean): --  RR: 17 (2022 05:11) (16 - 18)  SpO2: 100% (2022 05:11) (96% - 100%)    Labs:  Blood type: O Positive  Rubella IgG: RPR: Negative    LABS:                        9.7    18.04 )-----------( 208      ( 2022 07:08 )             28.4                           12.3   24.52<H> >-----------< 210    (  @ 07:45 )             35.1                        13.2   24.26<H> >-----------< 213    (  @ 01:04 )             38.0                        11.6   13.27<H> >-----------< 222    (  @ 19:33 )             33.5<L>    22 @ 01:04      135  |  104  |  10  ----------------------------<  84  3.7   |  17<L>  |  0.82        Ca    8.2<L>      2022 01:04    TPro  6.3  /  Alb  2.7<L>  /  TBili  0.5  /  DBili  x   /  AST  18  /  ALT  14  /  AlkPhos  137<H>  22 @ 01:04          PE:    Abdomen: soft, fundus firm, Mildly distended,  marked tenderness 8/10  incision c/d/i.  Lochia; scant  Extremities: No erythema, trace edema    A/P: 30yo POD#2 s/p LTCS due to CAT II complicated by chorioamnionitis now with signs of endometritis. Improving leukocytosis.  Upon further evaluation asked patient was the pain not there yesterday and she responded no because she was medicated.       - Continue regular diet.  - Increase ambulation.  - Continue Motrin, Tylenol, oxycodone PRN for pain control.  vs. continue PCEA for pain.  - F/u AM CBC  - Invanz to be started  - explained to the patient on why restarting antibiotics based on the fact that she had an infection during labor and now showing signs of endometritis.  She voiced that she understood    Chelsea Barajas M.D., M.B.A., M.S. Associate Chief of L & D ( late entry)    OB Progress Note:  Delivery, POD#2    S: 32yo POD#2 s/p LTCS .   Patient  reports that she has pain.      O:   Vital Signs Last 24 Hrs  T(C): 36.4 (2022 05:11), Max: 36.4 (2022 14:25)  T(F): 97.6 (2022 05:11), Max: 97.6 (2022 05:11)  HR: 63 (2022 05:11) (63 - 73)  BP: 101/54 (2022 05:11) (92/50 - 102/62)  BP(mean): --  RR: 17 (2022 05:11) (16 - 18)  SpO2: 100% (2022 05:11) (96% - 100%)    Labs:  Blood type: O Positive  Rubella IgG: RPR: Negative    LABS:                        9.7    18.04 )-----------( 208      ( 2022 07:08 )             28.4                           12.3   24.52<H> >-----------< 210    (  @ 07:45 )             35.1                        13.2   24.26<H> >-----------< 213    (  @ 01:04 )             38.0                        11.6   13.27<H> >-----------< 222    (  @ 19:33 )             33.5<L>    22 @ 01:04      135  |  104  |  10  ----------------------------<  84  3.7   |  17<L>  |  0.82        Ca    8.2<L>      2022 01:04    TPro  6.3  /  Alb  2.7<L>  /  TBili  0.5  /  DBili  x   /  AST  18  /  ALT  14  /  AlkPhos  137<H>  22 @ 01:04          PE:    Abdomen: soft, fundus firm, Mildly distended,  marked tenderness 8/10  incision c/d/i.  Lochia; scant  Extremities: No erythema, trace edema    A/P: 32yo POD#2 s/p LTCS due to CAT II complicated by chorioamnionitis now with signs of endometritis. Improving leukocytosis and GHTN. Upon further evaluation asked patient was the pain not there yesterday and she responded no because she was medicated.       - Continue regular diet.  - Increase ambulation.  - Continue Motrin, Tylenol, oxycodone PRN for pain control.  vs. continue PCEA for pain.  - F/u AM CBC  - Continue to monitor BP's  - Invanz to be started  - explained to the patient on why restarting antibiotics based on the fact that she had an infection during labor and now showing signs of endometritis.  She voiced that she understood    Chelsea Barajas M.D., M.B.A., M.S.

## 2022-01-28 NOTE — DISCHARGE NOTE OB - MEDICATION SUMMARY - MEDICATIONS TO TAKE
I will START or STAY ON the medications listed below when I get home from the hospital:    acetaminophen 325 mg oral tablet  -- 3 tab(s) by mouth every 6 hours  -- Indication: For Pain    ibuprofen 600 mg oral tablet  -- 1 tab(s) by mouth every 6 hours  -- Indication: For Pain    oxyCODONE 5 mg oral tablet  -- 1 tab(s) by mouth every 8 hours, As Needed  -for severe pain MDD:3 tabs   -- Caution federal law prohibits the transfer of this drug to any person other  than the person for whom it was prescribed.  It is very important that you take or use this exactly as directed.  Do not skip doses or discontinue unless directed by your doctor.  May cause drowsiness or dizziness.  This prescription cannot be refilled.  Using more of this medication than prescribed may cause serious breathing problems.    -- Indication: For Severe Pain

## 2022-01-28 NOTE — DISCHARGE NOTE OB - PROVIDER TOKENS
FREE:[LAST:[[facility]],PHONE:[phone]],FAX:[(   )    -],ADDRESS:[[place] [address] [place] [address]]]

## 2022-01-28 NOTE — DISCHARGE NOTE OB - COMMUNITY RESOURCE CONTACT NUMBER:
Patient to call and schedule baby's first-visit appointment at  [facility]: Division of General Pediatrics [address], [address] [place] [address]  ([phone]) so that baby is evaluated by pediatrician 1 to 2 days after hospital discharge.

## 2022-01-28 NOTE — DISCHARGE NOTE OB - CRACKED, BLEEDING NIPPLES
Has The Cancer Been Biopsied Before?: has been previously biopsied Body Location Override (Optional): left superior central forehead Accession (Optional): [id number] Who Is Your Referring Provider?: Dr. Reese When Was Your Biopsy?: 08/05/2019 Statement Selected

## 2022-01-28 NOTE — DISCHARGE NOTE OB - ADDITIONAL INSTRUCTIONS
Please make an appointment with your Ob/Gyn in 3 days for a blood pressure check. Additionally, make an appointment for follow-up and an incision check in 2 weeks. Please call for appointment: [phone]  Regular diet.  Resume normal activity as tolerated. Follow up at [facility] in 6 weeks.  No heavy lifting, driving, or strenuous activity for 6 weeks.  Nothing per vagina such as tampons, intercourse, douches or tub baths for 6 weeks or until you see your doctor.  Call your doctor with any signs and symptoms of infection such as fever, chills, nausea or vomiting.  Call your doctor if you're unable to tolerate food, increase in vaginal bleeding or have difficulty urinating.  Call your doctor if you have pain that is not relieved by your prescribed medications.  Notify your doctor with any other concerns.

## 2022-01-29 LAB
HCT VFR BLD CALC: 30.6 % — LOW (ref 34.5–45)
HGB BLD-MCNC: 10.4 G/DL — LOW (ref 11.5–15.5)
MCHC RBC-ENTMCNC: 31.3 PG — SIGNIFICANT CHANGE UP (ref 27–34)
MCHC RBC-ENTMCNC: 34 GM/DL — SIGNIFICANT CHANGE UP (ref 32–36)
MCV RBC AUTO: 92.2 FL — SIGNIFICANT CHANGE UP (ref 80–100)
NRBC # BLD: 0 /100 WBCS — SIGNIFICANT CHANGE UP
NRBC # FLD: 0 K/UL — SIGNIFICANT CHANGE UP
PLATELET # BLD AUTO: 206 K/UL — SIGNIFICANT CHANGE UP (ref 150–400)
RBC # BLD: 3.32 M/UL — LOW (ref 3.8–5.2)
RBC # FLD: 13.6 % — SIGNIFICANT CHANGE UP (ref 10.3–14.5)
WBC # BLD: 13.05 K/UL — HIGH (ref 3.8–10.5)
WBC # FLD AUTO: 13.05 K/UL — HIGH (ref 3.8–10.5)

## 2022-01-29 RX ADMIN — Medication 975 MILLIGRAM(S): at 04:40

## 2022-01-29 RX ADMIN — Medication 600 MILLIGRAM(S): at 19:15

## 2022-01-29 RX ADMIN — SIMETHICONE 80 MILLIGRAM(S): 80 TABLET, CHEWABLE ORAL at 09:41

## 2022-01-29 RX ADMIN — Medication 600 MILLIGRAM(S): at 02:10

## 2022-01-29 RX ADMIN — Medication 975 MILLIGRAM(S): at 09:41

## 2022-01-29 RX ADMIN — Medication 600 MILLIGRAM(S): at 23:44

## 2022-01-29 RX ADMIN — Medication 600 MILLIGRAM(S): at 13:45

## 2022-01-29 RX ADMIN — Medication 975 MILLIGRAM(S): at 16:03

## 2022-01-29 RX ADMIN — Medication 975 MILLIGRAM(S): at 20:38

## 2022-01-29 RX ADMIN — Medication 975 MILLIGRAM(S): at 20:08

## 2022-01-29 RX ADMIN — Medication 975 MILLIGRAM(S): at 03:40

## 2022-01-29 RX ADMIN — Medication 600 MILLIGRAM(S): at 01:14

## 2022-01-29 RX ADMIN — Medication 600 MILLIGRAM(S): at 06:01

## 2022-01-29 RX ADMIN — HEPARIN SODIUM 10000 UNIT(S): 5000 INJECTION INTRAVENOUS; SUBCUTANEOUS at 18:17

## 2022-01-29 RX ADMIN — HEPARIN SODIUM 10000 UNIT(S): 5000 INJECTION INTRAVENOUS; SUBCUTANEOUS at 05:43

## 2022-01-29 RX ADMIN — Medication 600 MILLIGRAM(S): at 18:16

## 2022-01-29 RX ADMIN — SIMETHICONE 80 MILLIGRAM(S): 80 TABLET, CHEWABLE ORAL at 15:17

## 2022-01-29 RX ADMIN — ERTAPENEM SODIUM 120 MILLIGRAM(S): 1 INJECTION, POWDER, LYOPHILIZED, FOR SOLUTION INTRAMUSCULAR; INTRAVENOUS at 12:47

## 2022-01-29 RX ADMIN — Medication 600 MILLIGRAM(S): at 12:47

## 2022-01-29 RX ADMIN — Medication 975 MILLIGRAM(S): at 15:17

## 2022-01-29 RX ADMIN — Medication 975 MILLIGRAM(S): at 10:45

## 2022-01-29 NOTE — PROGRESS NOTE ADULT - ASSESSMENT
A/P: 30yo  gHTN POD#3 s/p pLTCS for arrest c/b chorio and endometritis.  Patient is stable and is doing well post-operatively.    - gHTN: BP wnl overnight, ctm q4hrs, no signs or symptoms of PEC  - chorio/endometritis: s/p Invanz, afebrile for over 24 hrs. Downtrending leukocytosis 24->>13  - Continue motrin, tylenol, oxycodone PRN for pain control.  - Increase ambulation, encourage SCDs when not ambulating, Heparin for DVT ppx  - Continue regular diet  - Discharge planning    LUIS Michaels PGY1

## 2022-01-30 VITALS
SYSTOLIC BLOOD PRESSURE: 117 MMHG | RESPIRATION RATE: 18 BRPM | DIASTOLIC BLOOD PRESSURE: 70 MMHG | OXYGEN SATURATION: 100 % | TEMPERATURE: 98 F | HEART RATE: 84 BPM

## 2022-01-30 RX ORDER — BNT162B2 0.23 MG/2.25ML
0.3 INJECTION, SUSPENSION INTRAMUSCULAR ONCE
Refills: 0 | Status: DISCONTINUED | OUTPATIENT
Start: 2022-01-30 | End: 2022-01-30

## 2022-01-30 RX ADMIN — Medication 600 MILLIGRAM(S): at 12:05

## 2022-01-30 RX ADMIN — Medication 975 MILLIGRAM(S): at 08:45

## 2022-01-30 RX ADMIN — Medication 600 MILLIGRAM(S): at 00:14

## 2022-01-30 RX ADMIN — Medication 975 MILLIGRAM(S): at 04:01

## 2022-01-30 RX ADMIN — Medication 975 MILLIGRAM(S): at 03:31

## 2022-01-30 RX ADMIN — Medication 600 MILLIGRAM(S): at 06:12

## 2022-01-30 RX ADMIN — ERTAPENEM SODIUM 120 MILLIGRAM(S): 1 INJECTION, POWDER, LYOPHILIZED, FOR SOLUTION INTRAMUSCULAR; INTRAVENOUS at 12:05

## 2022-01-30 RX ADMIN — Medication 600 MILLIGRAM(S): at 13:00

## 2022-01-30 RX ADMIN — HEPARIN SODIUM 10000 UNIT(S): 5000 INJECTION INTRAVENOUS; SUBCUTANEOUS at 05:36

## 2022-01-30 RX ADMIN — Medication 975 MILLIGRAM(S): at 08:28

## 2022-01-30 RX ADMIN — Medication 600 MILLIGRAM(S): at 05:36

## 2022-01-30 NOTE — PROGRESS NOTE ADULT - SUBJECTIVE AND OBJECTIVE BOX
OB Postpartum Note:  Delivery, POD#3    S: 32yo  gHTN POD#3 s/p pLTCS for arrest c/b chorio and endometritis. Patient was bradycardic on POD#0 but has since been normotensive with an EKG on  that was NSR. The patient feels well.  Pain is well controlled. She is tolerating a regular diet and passing flatus. She is voiding spontaneously, and ambulating without difficulty. Denies CP/SOB. Denies HA, change in vision, RUQ pain, lightheadedness, dizziness, or  N/V.    O:  Vitals:  Vital Signs Last 24 Hrs  T(C): 36.9 (2022 06:38), Max: 37.1 (2022 11:10)  T(F): 98.4 (2022 06:38), Max: 98.7 (2022 11:10)  HR: 81 (2022 06:38) (73 - 81)  BP: 108/57 (2022 06:38) (106/57 - 114/62)  BP(mean): --  RR: 18 (2022 06:38) (16 - 18)  SpO2: 99% (2022 06:38) (98% - 100%)    MEDICATIONS  (STANDING):  acetaminophen     Tablet .. 975 milliGRAM(s) Oral <User Schedule>  diphtheria/tetanus/pertussis (acellular) Vaccine (ADAcel) 0.5 milliLiter(s) IntraMuscular once  ertapenem  IVPB 1000 milliGRAM(s) IV Intermittent every 24 hours  heparin   Injectable 03847 Unit(s) SubCutaneous every 12 hours  ibuprofen  Tablet. 600 milliGRAM(s) Oral every 6 hours  influenza   Vaccine 0.5 milliLiter(s) IntraMuscular once  oxytocin Infusion 333.333 milliUNIT(s)/Min (1000 mL/Hr) IV Continuous <Continuous>    MEDICATIONS  (PRN):  diphenhydrAMINE 25 milliGRAM(s) Oral every 6 hours PRN Pruritus  HYDROmorphone  Injectable 1 milliGRAM(s) IV Push every 3 hours PRN Severe Pain (7 - 10)  lanolin Ointment 1 Application(s) Topical every 6 hours PRN Sore Nipples  magnesium hydroxide Suspension 30 milliLiter(s) Oral two times a day PRN Constipation  naloxone Injectable 0.1 milliGRAM(s) IV Push every 3 minutes PRN For ANY of the following changes in patient status:  A. RR LESS THAN 10 breaths per minute, B. Oxygen saturation LESS THAN 90%, C. Sedation score of 6  ondansetron Injectable 4 milliGRAM(s) IV Push every 6 hours PRN Nausea  oxyCODONE    IR 5 milliGRAM(s) Oral every 3 hours PRN Mild Pain (1 - 3)  oxyCODONE    IR 10 milliGRAM(s) Oral every 3 hours PRN Moderate Pain (4 - 6)  oxyCODONE    IR 5 milliGRAM(s) Oral every 3 hours PRN Moderate to Severe Pain (4-10)  oxyCODONE    IR 5 milliGRAM(s) Oral once PRN Moderate to Severe Pain (4-10)  simethicone 80 milliGRAM(s) Chew every 4 hours PRN Gas      LABS:  Blood type: O Positive  Rubella IgG: RPR: Negative                          10.4<L>   13.05<H> >-----------< 206    (  @ 07:07 )             30.6<L>                        9.7<L>   18.04<H> >-----------< 208    (  @ 07:08 )             28.4<L>                        12.3   24.52<H> >-----------< 210    (  @ 07:45 )             35.1                        13.2   24.26<H> >-----------< 213    (  @ 01:04 )             38.0    22 @ 01:04      135  |  104  |  10  ----------------------------<  84  3.7   |  17<L>  |  0.82        Ca    8.2<L>      2022 01:04    TPro  6.3  /  Alb  2.7<L>  /  TBili  0.5  /  DBili  x   /  AST  18  /  ALT  14  /  AlkPhos  137<H>  22 @ 01:04          Physical exam:  Gen: NAD  CV: RRR  Pulm: CTAB  Abdomen: Soft, nontender, no distension , firm uterine fundus at umbilicus.  Incision: Clean, dry, and intact   Pelvic: Normal lochia noted  Ext: No calf tenderness or edema          
OB Postpartum Note:  Delivery, POD#3    S: 32yo gHTN POD#4 s/p pLTCS for arrest c/b chorio and endometritis. The patient feels well.  Pain is well controlled. She is tolerating a regular diet and passing flatus. She is voiding spontaneously, and ambulating without difficulty. Denies CP/SOB. Denies HA, change in vision, RUQ pain, lightheadedness, dizziness, or  N/V.    O:  Vitals:  Vital Signs Last 24 Hrs  T(C): 36.8 (2022 06:21), Max: 37 (2022 10:37)  T(F): 98.2 (2022 06:21), Max: 98.6 (2022 10:37)  HR: 65 (2022 06:21) (65 - 75)  BP: 119/67 (2022 06:21) (109/62 - 124/60)  BP(mean): --  RR: 17 (2022 06:21) (17 - 18)  SpO2: 100% (2022 06:21) (98% - 100%)    MEDICATIONS  (STANDING):  acetaminophen     Tablet .. 975 milliGRAM(s) Oral <User Schedule>  diphtheria/tetanus/pertussis (acellular) Vaccine (ADAcel) 0.5 milliLiter(s) IntraMuscular once  ertapenem  IVPB 1000 milliGRAM(s) IV Intermittent every 24 hours  heparin   Injectable 33312 Unit(s) SubCutaneous every 12 hours  ibuprofen  Tablet. 600 milliGRAM(s) Oral every 6 hours  influenza   Vaccine 0.5 milliLiter(s) IntraMuscular once  oxytocin Infusion 333.333 milliUNIT(s)/Min (1000 mL/Hr) IV Continuous <Continuous>    MEDICATIONS  (PRN):  diphenhydrAMINE 25 milliGRAM(s) Oral every 6 hours PRN Pruritus  HYDROmorphone  Injectable 1 milliGRAM(s) IV Push every 3 hours PRN Severe Pain (7 - 10)  lanolin Ointment 1 Application(s) Topical every 6 hours PRN Sore Nipples  magnesium hydroxide Suspension 30 milliLiter(s) Oral two times a day PRN Constipation  naloxone Injectable 0.1 milliGRAM(s) IV Push every 3 minutes PRN For ANY of the following changes in patient status:  A. RR LESS THAN 10 breaths per minute, B. Oxygen saturation LESS THAN 90%, C. Sedation score of 6  ondansetron Injectable 4 milliGRAM(s) IV Push every 6 hours PRN Nausea  oxyCODONE    IR 5 milliGRAM(s) Oral every 3 hours PRN Mild Pain (1 - 3)  oxyCODONE    IR 10 milliGRAM(s) Oral every 3 hours PRN Moderate Pain (4 - 6)  oxyCODONE    IR 5 milliGRAM(s) Oral every 3 hours PRN Moderate to Severe Pain (4-10)  oxyCODONE    IR 5 milliGRAM(s) Oral once PRN Moderate to Severe Pain (4-10)  simethicone 80 milliGRAM(s) Chew every 4 hours PRN Gas      LABS:  Blood type: O Positive  Rubella IgG: RPR: Negative                          10.4<L>   13.05<H> >-----------< 206    (  @ 07:07 )             30.6<L>                        9.7<L>   18.04<H> >-----------< 208    (  @ 07:08 )             28.4<L>          Physical exam:  Gen: NAD  CV: RRR  Pulm: CTAB  Abdomen: Soft, nontender, no distension, firm uterine fundus at umbilicus.  Incision: Clean, dry, and intact   Pelvic: Normal lochia noted  Ext: 2+ edema up to knees b/l, no calf tenderness or erythema          
32 y/o  @ 38/1 weeks IOL for poly MELISSA 37, unstable lie s/p ECV    vertex confirmed by bedside sono  continue sono q shift     bradley RAMOS
OB Progress Note:  Delivery, POD#1    S: 32yo POD#1 s/p pLTCS for Arrest c/b gHTN and chorioamnionitis. Her pain is well controlled. She is tolerating a regular diet. Not yet passing flatus. Fonseca still in place. Denies N/V. Denies CP/SOB/lightheadedness/dizziness.     O:   Vital Signs Last 24 Hrs  T(C): 36.6 (2022 07:03), Max: 38.3 (2022 20:14)  T(F): 97.9 (2022 07:03), Max: 100.94 (2022 20:14)  HR: 58 (2022 07:03) (48 - 108)  BP: 107/64 (2022 07:03) (94/51 - 158/92)  BP(mean): 98 (2022 02:00) (85 - 106)  RR: 17 (2022 07:03) (14 - 24)  SpO2: 97% (2022 07:03) (81% - 100%)    Labs:  Blood type: O Positive  Rubella IgG: RPR: Negative                          13.2   24.26<H> >-----------< 213    (  @ 01:04 )             38.0                        11.6   13.27<H> >-----------< 222    (  @ 19:33 )             33.5<L>    22 @ 01:04      135  |  104  |  10  ----------------------------<  84  3.7   |  17<L>  |  0.82        Ca    8.2<L>      2022 01:04    TPro  6.3  /  Alb  2.7<L>  /  TBili  0.5  /  DBili  x   /  AST  18  /  ALT  14  /  AlkPhos  137<H>  22 @ 01:04          acetaminophen     Tablet .. 975 milliGRAM(s) Oral <User Schedule>  diphenhydrAMINE 25 milliGRAM(s) Oral every 6 hours PRN  diphtheria/tetanus/pertussis (acellular) Vaccine (ADAcel) 0.5 milliLiter(s) IntraMuscular once  heparin   Injectable 84881 Unit(s) SubCutaneous every 12 hours  HYDROmorphone  Injectable 1 milliGRAM(s) IV Push every 3 hours PRN  ibuprofen  Tablet. 600 milliGRAM(s) Oral every 6 hours  influenza   Vaccine 0.5 milliLiter(s) IntraMuscular once  ketorolac   Injectable 30 milliGRAM(s) IV Push every 6 hours  lactated ringers. 1000 milliLiter(s) IV Continuous <Continuous>  lanolin Ointment 1 Application(s) Topical every 6 hours PRN  magnesium hydroxide Suspension 30 milliLiter(s) Oral two times a day PRN  morphine PF Epidural 2 milliGRAM(s) Epidural once  naloxone Injectable 0.1 milliGRAM(s) IV Push every 3 minutes PRN  ondansetron Injectable 4 milliGRAM(s) IV Push every 6 hours PRN  oxyCODONE    IR 5 milliGRAM(s) Oral every 3 hours PRN  oxyCODONE    IR 10 milliGRAM(s) Oral every 3 hours PRN  oxyCODONE    IR 5 milliGRAM(s) Oral every 3 hours PRN  oxyCODONE    IR 5 milliGRAM(s) Oral once PRN  oxytocin Infusion 333.333 milliUNIT(s)/Min IV Continuous <Continuous>  oxytocin Infusion 333.333 milliUNIT(s)/Min IV Continuous <Continuous>  simethicone 80 milliGRAM(s) Chew every 4 hours PRN      PE:  General: NAD  Abdomen: Mildly distended, appropriately tender, incision c/d/i.  Extremities: No erythema, no pitting edema    
OB Progress Note: LTCS, POD#2    S: 32yo POD#2 s/p pLTCS for arrest c/b gHTN and chorioamnionitis. Pain is well controlled. She is tolerating a regular diet and passing flatus. She is voiding spontaneously, and ambulating without difficulty. Denies CP/SOB. Denies lightheadedness/dizziness. Denies N/V.    O:  Vitals:  Vital Signs Last 24 Hrs  T(C): 36.4 (28 Jan 2022 05:11), Max: 36.4 (27 Jan 2022 09:09)  T(F): 97.6 (28 Jan 2022 05:11), Max: 97.6 (28 Jan 2022 05:11)  HR: 63 (28 Jan 2022 05:11) (57 - 73)  BP: 101/54 (28 Jan 2022 05:11) (92/50 - 103/56)  BP(mean): --  RR: 17 (28 Jan 2022 05:11) (16 - 18)  SpO2: 100% (28 Jan 2022 05:11) (96% - 100%)    MEDICATIONS  (STANDING):  acetaminophen     Tablet .. 975 milliGRAM(s) Oral <User Schedule>  diphtheria/tetanus/pertussis (acellular) Vaccine (ADAcel) 0.5 milliLiter(s) IntraMuscular once  heparin   Injectable 85477 Unit(s) SubCutaneous every 12 hours  ibuprofen  Tablet. 600 milliGRAM(s) Oral every 6 hours  influenza   Vaccine 0.5 milliLiter(s) IntraMuscular once  oxytocin Infusion 333.333 milliUNIT(s)/Min (1000 mL/Hr) IV Continuous <Continuous>      MEDICATIONS  (PRN):  diphenhydrAMINE 25 milliGRAM(s) Oral every 6 hours PRN Pruritus  HYDROmorphone  Injectable 1 milliGRAM(s) IV Push every 3 hours PRN Severe Pain (7 - 10)  lanolin Ointment 1 Application(s) Topical every 6 hours PRN Sore Nipples  magnesium hydroxide Suspension 30 milliLiter(s) Oral two times a day PRN Constipation  naloxone Injectable 0.1 milliGRAM(s) IV Push every 3 minutes PRN For ANY of the following changes in patient status:  A. RR LESS THAN 10 breaths per minute, B. Oxygen saturation LESS THAN 90%, C. Sedation score of 6  ondansetron Injectable 4 milliGRAM(s) IV Push every 6 hours PRN Nausea  oxyCODONE    IR 5 milliGRAM(s) Oral every 3 hours PRN Mild Pain (1 - 3)  oxyCODONE    IR 10 milliGRAM(s) Oral every 3 hours PRN Moderate Pain (4 - 6)  oxyCODONE    IR 5 milliGRAM(s) Oral every 3 hours PRN Moderate to Severe Pain (4-10)  oxyCODONE    IR 5 milliGRAM(s) Oral once PRN Moderate to Severe Pain (4-10)  simethicone 80 milliGRAM(s) Chew every 4 hours PRN Gas      Labs:  Blood type: O Positive  Rubella IgG: RPR: Negative                          12.3   24.52<H> >-----------< 210    ( 01-27 @ 07:45 )             35.1                        13.2   24.26<H> >-----------< 213    ( 01-27 @ 01:04 )             38.0    01-27-22 @ 01:04      135  |  104  |  10  ----------------------------<  84  3.7   |  17<L>  |  0.82        Ca    8.2<L>      27 Jan 2022 01:04    TPro  6.3  /  Alb  2.7<L>  /  TBili  0.5  /  DBili  x   /  AST  18  /  ALT  14  /  AlkPhos  137<H>  01-27-22 @ 01:04          PE:  General: NAD  Abdomen: Soft, appropriately tender, incision c/d/i.  Extremities: No erythema, no pitting edema

## 2022-01-30 NOTE — PROGRESS NOTE ADULT - ASSESSMENT
A/P: 32yo gHTN POD#4 s/p pLTCS for arrest c/b chorio and endometritis.  Patient is stable and is doing well post-operatively.    - BP wnl overnight, ctm q4 hrs, no signs or symptoms of PEC  - Continue motrin, tylenol, oxycodone PRN for pain control.  - BP cuff prescription given  - Increase ambulation, encourage SCDs when not ambulating, Heparin for DVT ppx  - Continue regular diet  - Discharge planning    LUIS Michaels PGY1 A/P: 30yo gHTN POD#4 s/p pLTCS for arrest c/b chorio and endometritis.  Patient is stable and is doing well post-operatively.    - BP wnl overnight, ctm q4 hrs, no signs or symptoms of PEC  - BP cuff prescription given, patient instructed to take BP at home  - chorio/endometritis: s/p Invanz, afebrile for over 24 hrs. Downtrending leukocytosis 24->>13  - Continue motrin, tylenol, oxycodone PRN for pain control.  - Increase ambulation, encourage SCDs when not ambulating, Heparin for DVT ppx  - Continue regular diet  - Discharge planning    LUIS Michaels PGY1

## 2022-01-30 NOTE — DISCHARGE NOTE NURSING/CASE MANAGEMENT/SOCIAL WORK - PATIENT PORTAL LINK FT
You can access the FollowMyHealth Patient Portal offered by [facility] by registering at the following website: [email or web address]. By joining [employer]’s FollowMyHealth portal, you will also be able to view your health information using other applications (apps) compatible with our system.

## 2022-01-30 NOTE — DISCHARGE NOTE NURSING/CASE MANAGEMENT/SOCIAL WORK - NSDCPEFALRISK_GEN_ALL_CORE
For information on Fall & Injury Prevention, visit: [email or web address] OR  [email or web address] OR  https://www.cdc.gov/steadi/patient.html

## 2022-01-31 ENCOUNTER — NON-APPOINTMENT (OUTPATIENT)
Age: 32
End: 2022-01-31

## 2022-01-31 RX ORDER — ACETAMINOPHEN 325 MG/1
325 TABLET ORAL
Refills: 0 | Status: ACTIVE | COMMUNITY
Start: 2022-01-31

## 2022-01-31 RX ORDER — IBUPROFEN 200 MG/1
200 TABLET ORAL
Refills: 0 | Status: ACTIVE | COMMUNITY
Start: 2022-01-31

## 2022-01-31 RX ORDER — ALBUTEROL SULFATE 90 UG/1
108 (90 BASE) INHALANT RESPIRATORY (INHALATION)
Refills: 0 | Status: ACTIVE | COMMUNITY
Start: 2022-01-31

## 2022-02-01 ENCOUNTER — FORM ENCOUNTER (OUTPATIENT)
Age: 32
End: 2022-02-01

## 2022-02-02 ENCOUNTER — NON-APPOINTMENT (OUTPATIENT)
Age: 32
End: 2022-02-02

## 2022-02-03 ENCOUNTER — OUTPATIENT (OUTPATIENT)
Dept: OUTPATIENT SERVICES | Facility: HOSPITAL | Age: 32
LOS: 1 days | End: 2022-02-03

## 2022-02-03 ENCOUNTER — APPOINTMENT (OUTPATIENT)
Dept: OBGYN | Facility: HOSPITAL | Age: 32
End: 2022-02-03

## 2022-02-03 VITALS
TEMPERATURE: 97.9 F | DIASTOLIC BLOOD PRESSURE: 71 MMHG | SYSTOLIC BLOOD PRESSURE: 105 MMHG | BODY MASS INDEX: 38.45 KG/M2 | HEIGHT: 67 IN | WEIGHT: 245 LBS | HEART RATE: 91 BPM

## 2022-02-03 DIAGNOSIS — Z98.891 HISTORY OF UTERINE SCAR FROM PREVIOUS SURGERY: ICD-10-CM

## 2022-02-03 DIAGNOSIS — Z98.890 OTHER SPECIFIED POSTPROCEDURAL STATES: Chronic | ICD-10-CM

## 2022-02-03 NOTE — HISTORY OF PRESENT ILLNESS
[Postpartum Follow Up] : postpartum follow up [Complications:___] : complications include: [unfilled] [Delivery Date: ___] : on [unfilled] [Primary C/S] : delivered by  section [Female] : Delivery History: baby girl [Wt. ___] : weighing [unfilled] [Breastfeeding] : currently nursing [Discharge HCT: ___] : hematocrit level was [unfilled] [Discharge HGB: ___] : hemoglobin level was [unfilled] [Resumed Menses] : has not resumed her menses [Resumed [place]] : has not resumed intercourse [Intended Contraception] : Intended Contraception: [Clean/Dry/Intact] : clean, dry and intact [FreeTextEntry8] : Patient had prenatal care at [facility].  States pregnancy was uncomplicated until she was diagnosed with polyhydramnios.  Patient came to [facility] to deliver.  IOL --> C/S for NRFHT [de-identified] : Considering Depo Provera [de-identified] : Feeling well.  Very happy with new baby who was unplanned.  Hx of gHTN.  Manual /71. Denies any signs of PEC.   [de-identified] : Continue to monitor BP's at home.  Parameters reviewed.  Considering Depo Provera.  Continue PNV's while breastfeeding.  RTC 4 weeks for full PP visit

## 2022-02-07 ENCOUNTER — NON-APPOINTMENT (OUTPATIENT)
Age: 32
End: 2022-02-07

## 2022-02-07 DIAGNOSIS — Z98.891 HISTORY OF UTERINE SCAR FROM PREVIOUS SURGERY: ICD-10-CM

## 2022-02-14 ENCOUNTER — NON-APPOINTMENT (OUTPATIENT)
Age: 32
End: 2022-02-14

## 2022-02-15 ENCOUNTER — APPOINTMENT (OUTPATIENT)
Dept: CARDIOLOGY | Facility: HOSPITAL | Age: 32
End: 2022-02-15

## 2022-02-15 ENCOUNTER — NON-APPOINTMENT (OUTPATIENT)
Age: 32
End: 2022-02-15

## 2022-02-15 VITALS
WEIGHT: 240 LBS | RESPIRATION RATE: 16 BRPM | HEART RATE: 73 BPM | DIASTOLIC BLOOD PRESSURE: 71 MMHG | SYSTOLIC BLOOD PRESSURE: 112 MMHG | HEIGHT: 67 IN | BODY MASS INDEX: 37.67 KG/M2 | OXYGEN SATURATION: 98 %

## 2022-02-15 DIAGNOSIS — Z78.9 OTHER SPECIFIED HEALTH STATUS: ICD-10-CM

## 2022-02-15 DIAGNOSIS — Z87.59 PERSONAL HISTORY OF OTHER COMPLICATIONS OF PREGNANCY, CHILDBIRTH AND THE PUERPERIUM: ICD-10-CM

## 2022-02-15 NOTE — ASSESSMENT
[FreeTextEntry1] : Mrs. Downing is a 30 y/o obese female w/ history of gHTN and , presenting to the cardiology clinic for establishment of care.\par \par # gHTN\par - Patient with history of gHTN now postpartum 3 weeks out.\par - Patient remained normotensive after delivery off of antihypertensives: Transient hypertension of pregnancy.\par - patient remains asymptomatic. \par - she had some post-partum physiologic change which now has improved.\par - EKG today unremarkable.\par - she will continue to follow up with obgyn. \par - f/u with PCP for lipid profile and diabets monitor, BP management.\par \par

## 2022-02-15 NOTE — PHYSICAL EXAM
Refill request for  Amlodipine 5 mg tablet.  Last refill:  8/19/2020 #30 R-11  Alprazolam 0.25 mg tablet  Last refill: 7/28/2020 #15 R-3  Last dispensed: 1/23/2021  Last office visit/physical:  8/19/2020 for ER follow up, /90  Future appointment scheduled (FP)?  No       Lab Results   Component Value Date    GLUCOSE 93 01/03/2018    SODIUM 136 01/03/2018    POTASSIUM 4.7 01/03/2018    CHLORIDE 101 01/03/2018    BUN 20 01/03/2018    CREATININE 0.96 01/03/2018    CALCIUM 9.6 01/03/2018    ALBUMIN 4.6 01/03/2018    AST 28 01/03/2018    ALKPT 96 01/03/2018    GPT 40 01/03/2018    ANIONGAP 16 01/03/2018    BCRAT 21 01/03/2018    GLOB 3.2 01/03/2018    AGR 1.4 01/03/2018     Routed to Dr Trell miller pool   [Well Developed] : well developed [Well Nourished] : well nourished [No Acute Distress] : no acute distress [Obese] : obese [Normal Conjunctiva] : normal conjunctiva [Normal Venous Pressure] : normal venous pressure [No Carotid Bruit] : no carotid bruit [Normal S1, S2] : normal S1, S2 [No Murmur] : no murmur [No Rub] : no rub [No Gallop] : no gallop [Clear Lung Fields] : clear lung fields [Good Air Entry] : good air entry [No Respiratory Distress] : no respiratory distress  [Soft] : abdomen soft [Non Tender] : non-tender [No Masses/organomegaly] : no masses/organomegaly [Normal Bowel Sounds] : normal bowel sounds [Normal Gait] : normal gait [No Edema] : no edema [No Cyanosis] : no cyanosis [No Clubbing] : no clubbing [No Varicosities] : no varicosities [No Rash] : no rash [No Skin Lesions] : no skin lesions [Moves all extremities] : moves all extremities [No Focal Deficits] : no focal deficits [Normal Speech] : normal speech [Alert and Oriented] : alert and oriented [Normal memory] : normal memory

## 2022-02-15 NOTE — HISTORY OF PRESENT ILLNESS
[FreeTextEntry1] : Mrs. Downing is a 32 y/o obese female w/ history of gHTN and , presenting to the cardiology clinic for establishment of care.\par \par She recently delivered her baby on 22 without complication at 37.6 weeks. She has been subjectively doing well afterwards, nursing without difficulty. \par She reports no cardiopulmonary symptoms. She denies CP, palpitation, SOB/KERR, refractory headache, lightheadedness, visual disturbance, syncope. Patient states feels well emotionally, denies current feelings of anxiety/depression. \par \par She initially had some swelling in the legs right after delivery, which gradually improved over past two weeks and has now returned to normal.

## 2022-02-22 ENCOUNTER — NON-APPOINTMENT (OUTPATIENT)
Age: 32
End: 2022-02-22

## 2022-02-28 ENCOUNTER — NON-APPOINTMENT (OUTPATIENT)
Age: 32
End: 2022-02-28

## 2022-03-03 ENCOUNTER — APPOINTMENT (OUTPATIENT)
Dept: OBGYN | Facility: HOSPITAL | Age: 32
End: 2022-03-03

## 2022-03-03 ENCOUNTER — OUTPATIENT (OUTPATIENT)
Dept: OUTPATIENT SERVICES | Facility: HOSPITAL | Age: 32
LOS: 1 days | End: 2022-03-03

## 2022-03-03 ENCOUNTER — MED ADMIN CHARGE (OUTPATIENT)
Age: 32
End: 2022-03-03

## 2022-03-03 VITALS
DIASTOLIC BLOOD PRESSURE: 61 MMHG | WEIGHT: 235 LBS | BODY MASS INDEX: 36.88 KG/M2 | TEMPERATURE: 98.2 F | HEIGHT: 67 IN | HEART RATE: 80 BPM | SYSTOLIC BLOOD PRESSURE: 123 MMHG

## 2022-03-03 DIAGNOSIS — Z98.891 HISTORY OF UTERINE SCAR FROM PREVIOUS SURGERY: ICD-10-CM

## 2022-03-03 DIAGNOSIS — Z98.890 OTHER SPECIFIED POSTPROCEDURAL STATES: Chronic | ICD-10-CM

## 2022-03-03 DIAGNOSIS — O13.9 GESTATIONAL [PREGNANCY-INDUCED] HYPERTENSION W/OUT SIGNIFICANT PROTEINURIA, UNSPECIFIED TRIMESTER: ICD-10-CM

## 2022-03-03 DIAGNOSIS — Z30.013 ENCOUNTER FOR INITIAL PRESCRIPTION OF INJECTABLE CONTRACEPTIVE: ICD-10-CM

## 2022-03-03 RX ORDER — MEDROXYPROGESTERONE ACETATE 150 MG/ML
150 INJECTION, SUSPENSION INTRAMUSCULAR
Qty: 0 | Refills: 0 | Status: COMPLETED | OUTPATIENT
Start: 2022-03-03

## 2022-03-03 RX ADMIN — MEDROXYPROGESTERONE ACETATE 0 MG/ML: 150 INJECTION, SUSPENSION INTRAMUSCULAR at 00:00

## 2022-03-03 NOTE — HISTORY OF PRESENT ILLNESS
[Delivery Date: ___] : on [unfilled] [Primary C/S] : delivered by  section [Female] : Delivery History: baby girl [Wt. ___] : weighing [unfilled] [Breastfeeding] : currently nursing [Discharge HCT: ___] : hematocrit level was [unfilled] [Discharge HGB: ___] : hemoglobin level was [unfilled] [Resumed Menses] : has resumed her menses [Resumed [place]] : has not resumed intercourse [Intended Contraception] : Intended Contraception: [Medroxyprogesterone Injection] : medroxyprogesterone acetate injection [None] : No associated symptoms are reported [Clean/Dry/Intact] : clean, dry and intact [Back to Normal] : is back to normal in size [Moderate] : moderate vaginal bleeding [Normal] : the vagina was normal [Not Done] : Examination of breasts not done [Doing Well] : is doing well [FreeTextEntry8] : PP visit [FreeTextEntry9] : polyhydramnios, GHTN [de-identified] : NRFHT  [de-identified] : BP today 123/61 s/p cardiology 2/15/22. Mother and baby doing well. Interested in depo provera start. [de-identified] : Pregnancy test today. [facility] depo 5/19-6/2.  Counseled regarding 18 month interval between pregnancies.

## 2022-03-04 DIAGNOSIS — Z30.013 ENCOUNTER FOR INITIAL PRESCRIPTION OF INJECTABLE CONTRACEPTIVE: ICD-10-CM

## 2022-03-07 LAB
HCG UR QL: NEGATIVE
QUALITY CONTROL: YES

## 2022-04-11 NOTE — OB PROVIDER DELIVERY SUMMARY - NS_DELIVERYATTENDING1_OBGYN_ALL_OB_FT
Chief Complaint   Patient presents with   • Dysuria       Narrative summary:   Quinton is a 29 year old female who is seen for evaluation of lower abdominal pain and fever of 102 since yesterday.  Pain is radiating to her back.  Denies urination for frequency, urgency or burning.  Reports normal daily bowel movements, able to pass gas.  Denies vaginal symptoms, she is on IUD contraception, reports breakthrough bleeding.  Taking ibuprofen to relieve symptoms.    I have reviewed the patient's medications and allergies, past medical, surgical, social and family history, updating these as appropriate.  See Histories section of the EMR for a display of this information.     Patient Active Problem List   Diagnosis   • Anxiety   • Attention deficit hyperactivity disorder (ADHD), predominantly inattentive type        Health Maintenance   Topic Date Due   • COVID-19 Vaccine (1) Never done   • Depression Screening  09/27/2022   • Cervical Cancer Screening - <30 3 year  06/09/2023   • DTaP/Tdap/Td Vaccine (8 - Td or Tdap) 10/27/2030   • Hepatitis B Vaccine  Completed   • Meningococcal Vaccine  Completed   • Influenza Vaccine  Completed   • HPV Vaccine  Completed   • Pneumococcal Vaccine 0-64  Aged Out        REVIEW OF SYSTEMS:  A review of systems in addition to that above remarkable reporting of:  Constitutional:  Positive for fever.     ENT:  Denies sinus congestion, sinus pain, sore throat.     Respiratory:  Patient denies pulmonary congestion, wheezing, shortness of breath, dyspnea on exertion, cough.  Cardiovascular:  Patient denies chest pain  Gastrointestinal:  Positive as above  Genitourinary:  Patient denies dysuria, hematuria, urgency.  Musculoskeletal:  Positive for some low back symptoms  Neurologic:  Patient denies headache  Hematologic/ Lymphatic:  Patient denies bleeding.  Allergy/ Immunologic:  Patient denies recurrent infections or allergic symptoms.       OBJECTIVE:  Vital signs:   Visit Vitals  /70 (BP  Location: LUE - Left upper extremity, Patient Position: Sitting, Cuff Size: Regular)   Pulse (!) 104   Temp 98.6 °F (37 °C) (Tympanic)   Resp 18   LMP 03/05/2022   SpO2 100%      General: A White  female in no acute distress.  Alert, cooperative, interactive.  HEENT:   Eyes- EOMI (extraocular muscles intact), PERRLA (pupils equal, round, reactive to light and accommodation), no conjunctival pallor or scleral abnormality.  Lungs:  Clear to auscultation.  No accessory muscle use.  Heart:   Regular rate & rhythm, no murmur, S3, S4, heaves or thrills.     Abdomen: Soft, bowel sounds present in all 4 quadrants.  There is diffuse lower abdominal tenderness with guarding and rebound.  Neurologic:   Oriented x 3.  Cranial nerves II-XII intact to observation. No apraxia or ataxia observed.   Musculoskeletal:  There is normal flexor/ extensor tone in both upper & lower extremities.  Skin:  Warm & dry.  No rashes.     ASSESSMENT/PLAN:  1. Lower abdominal pain    2. Fever and chills      29-year-old very pleasant female presents with new onset of lower abdominal pain, associated with fever/chills.  Urinalysis is negative for UTI.  Urine pregnancy test is negative.  Discussed differential diagnosis including appendicitis, diverticulitis, pelvic inflammatory disease, ovarian torsion, complicated UTI.  Recommend further evaluation at emergency department.  Patient is agreeable to be transferred to [facility] ER.  Dr. Prieto contacted and accepted the patient.  Patient was advised to remain NPO until evaluation completed.  All questions answered.    Level 3 mask and face shield were used    I spent a total of 30 minutes on the day of the visit.  This includes pre-charting, chart review, documenting and referring/communicating with other health care professionals.      See Patient Instructions   Orders Placed This Encounter   • POCT Urine Dip Auto   • POCT Urine pregnancy   • Urine, Bacterial Culture       No follow-ups  on file.         Deondre Samaniego MD

## 2022-04-26 LAB — SURGICAL PATHOLOGY STUDY: SIGNIFICANT CHANGE UP

## 2022-05-12 NOTE — OB NEONATOLOGY/PEDIATRICIAN DELIVERY SUMMARY - NSPEDSCALLREASON_OBGYN_ALL_OB
Non-Reassuring FHR/ Section/Obstetrician’s Request/Other Excisional Biopsy Additional Text (Leave Blank If You Do Not Want): The margin was drawn around the clinically apparent lesion. An elliptical shape was then drawn on the skin incorporating the lesion and margins.  Incisions were then made along these lines to the appropriate tissue plane and the lesion was extirpated.

## 2022-05-19 ENCOUNTER — APPOINTMENT (OUTPATIENT)
Dept: OBGYN | Facility: HOSPITAL | Age: 32
End: 2022-05-19

## 2022-05-19 ENCOUNTER — OUTPATIENT (OUTPATIENT)
Dept: OUTPATIENT SERVICES | Facility: HOSPITAL | Age: 32
LOS: 1 days | End: 2022-05-19

## 2022-05-19 VITALS
WEIGHT: 244 LBS | BODY MASS INDEX: 38.3 KG/M2 | DIASTOLIC BLOOD PRESSURE: 71 MMHG | SYSTOLIC BLOOD PRESSURE: 120 MMHG | TEMPERATURE: 98.1 F | HEIGHT: 67 IN | HEART RATE: 76 BPM

## 2022-05-19 DIAGNOSIS — Z98.890 OTHER SPECIFIED POSTPROCEDURAL STATES: Chronic | ICD-10-CM

## 2022-05-19 RX ORDER — MEDROXYPROGESTERONE ACETATE 150 MG/ML
150 INJECTION, SUSPENSION INTRAMUSCULAR
Qty: 0 | Refills: 0 | Status: COMPLETED | OUTPATIENT
Start: 2022-05-19

## 2022-05-19 RX ADMIN — MEDROXYPROGESTERONE ACETATE 0 MG/ML: 150 INJECTION, SUSPENSION INTRAMUSCULAR at 00:00

## 2022-05-20 DIAGNOSIS — Z30.013 ENCOUNTER FOR INITIAL PRESCRIPTION OF INJECTABLE CONTRACEPTIVE: ICD-10-CM

## 2022-08-18 ENCOUNTER — APPOINTMENT (OUTPATIENT)
Dept: OBGYN | Facility: HOSPITAL | Age: 32
End: 2022-08-18

## 2022-08-29 NOTE — OB RN DELIVERY SUMMARY - NS_LABORINFO_OBGYN_ALL_OB
Repeat BP performed.  Pt reports she did not take BP med today.  Vitals:    08/29/22 1734   BP: (!) 178/77   Pulse:    Resp:    Temp:        
Prolonged rupture of membranes (>12h)/Prolonged labor (>20hrs)

## 2025-02-10 NOTE — OB RN PATIENT PROFILE - NS_CIRCUMCISIONPLAN_OBGYN_ALL_OB
Medication:           amphetamine-dextroamphetamine (Adderall) 20 MG tablet              Sig: Take 1 tablet in the morning, 1 tablet in the afternoon and half a tablet in the early evening     Controlled Substances Refill Protocol - 12 Month Protocol - ALWAYS forward to ordering provider Failed   Protocol Details FORWARD TO PROVIDER - Refill requests for these medications must ALWAYS be forwarded to the ordering provider, even if all criteria are met    PDMP has been reviewed in last 24 hours    Urine/serum drug screen on file in the appropriate time frame    Active/up-to-date controlled substances agreement/consent on file         Last office visit date: 8/22/24, f/u 6 mo,  Message sent to [facility] to schedule patient    Medication Refill Protocol Failed.  Failed criteria: see above. Sent to clinician to review.      Last refill: 1/13/25 per PDMP  Is the patient due for refill of this medication(s): Yes 2/12/25  PDMP review: Criteria met. Forwarded to Physician/AMOR for signature.    N/A